# Patient Record
Sex: FEMALE | Race: WHITE
[De-identification: names, ages, dates, MRNs, and addresses within clinical notes are randomized per-mention and may not be internally consistent; named-entity substitution may affect disease eponyms.]

---

## 2019-09-16 ENCOUNTER — HOSPITAL ENCOUNTER (OUTPATIENT)
Dept: HOSPITAL 59 - SUR | Age: 79
Discharge: HOME | End: 2019-09-16
Attending: SURGERY
Payer: MEDICARE

## 2019-09-16 DIAGNOSIS — K80.10: Primary | ICD-10-CM

## 2019-09-16 DIAGNOSIS — F03.90: ICD-10-CM

## 2019-09-16 DIAGNOSIS — F31.9: ICD-10-CM

## 2019-09-18 NOTE — OPERATIVE NOTE
DATE OF SURGERY: 09/16/2019 



SURGEON: Sean Oconnor DO



PREOPERATIVE DIAGNOSIS: Cholelithiasis with chronic cholecystitis. 



POSTOPERATIVE DIAGNOSIS: Cholelithiasis with chronic cholecystitis. 



OPERATION: Laparoscopic cholecystectomy. 



PROCEDURE: The patient is an 79-year-old female who was brought to the operating
room and placed in a supine position. General anesthesia was administered per 
the department of anesthesia. The patient's abdomen was prepped and draped in 
the usual sterile fashion. The infraumbilical region was anesthetized with a 
total of 5 mL of 0.25% Sensorcaine with epinephrine. A 2 cm infraumbilical 
incision was made. This was carried down to the anterior rectus fascia. This was
incised. Kocher clamps were placed on the fascial edges and brought up into the 
wound. Stay sutures of 0 Vicryl were placed. Posterior rectus sheath was 
identified and incised. The peritoneal cavity was entered bluntly. At this time,
a 10 mm blunt Kathy port was placed. Adequate pneumoperitoneum was established.
The patient's entire upper abdomen was really densely adherent. I could not 
really see with the exception of the left upper quadrant. Therefore, additional 
5 mm left upper quadrant port was placed and switched to a 5 mm lens. Through 
the 10 mm port, adhesiolysis was done freeing up the entire upper abdomen. These
were all loose filmy adhesions without evidence of any bowel adhesions. She had 
some dense adhesions in her right lower quadrant consistent with her open 
appendectomy which were left alone. Three additional 5 mm ports were placed in 
the right upper quadrant. The patient was rotated into reverse Trendelenburg 
with rotation to left. The gallbladder retracted in a cephalad and lateral 
direction opening up the angle of Calot. The hepatocystic triangle was 
thoroughly dissected out. There was no aberrant anatomy, no posterior ductal 
structures. The cystic duct and cystic artery were clearly identified. Each one 
was doubly clipped and cut in a standard fashion. We had an excellent critical 
view of safety due to the distal half of the gallbladder released from the 
cystic plate. Once these were doubly clipped and cut in standard fashion, the 
gallbladder was taken off the liver bed with ACE Harmonic. This was placed in an
EndoCatch bag and brought out through the umbilical port. Right upper quadrant 
was rechecked and found to be hemostatic. No bleeding. No bile leaking. No bowel
injury noted. The patient was leveled out. The pneumoperitoneum was released. 
All ports were removed. The fascia was closed with 0 Vicryl in a figure-of-eight
fashion. The skin at all 5 ports was closed with 4-0 Vicryl. The patient was 
taken to the recovery room in stable condition. Final pathology pending. 

SHIELAD

## 2019-09-19 ENCOUNTER — HOSPITAL ENCOUNTER (INPATIENT)
Dept: HOSPITAL 59 - ER | Age: 79
LOS: 3 days | Discharge: HOME HEALTH | DRG: 689 | End: 2019-09-22
Attending: INTERNAL MEDICINE | Admitting: INTERNAL MEDICINE
Payer: MEDICARE

## 2019-09-19 DIAGNOSIS — F20.9: ICD-10-CM

## 2019-09-19 DIAGNOSIS — M19.90: ICD-10-CM

## 2019-09-19 DIAGNOSIS — F31.9: ICD-10-CM

## 2019-09-19 DIAGNOSIS — T17.920A: ICD-10-CM

## 2019-09-19 DIAGNOSIS — R53.1: ICD-10-CM

## 2019-09-19 DIAGNOSIS — R13.10: ICD-10-CM

## 2019-09-19 DIAGNOSIS — J18.9: ICD-10-CM

## 2019-09-19 DIAGNOSIS — Z29.9: ICD-10-CM

## 2019-09-19 DIAGNOSIS — I48.91: ICD-10-CM

## 2019-09-19 DIAGNOSIS — E03.9: ICD-10-CM

## 2019-09-19 DIAGNOSIS — F03.90: ICD-10-CM

## 2019-09-19 DIAGNOSIS — R40.4: ICD-10-CM

## 2019-09-19 DIAGNOSIS — N39.0: Primary | ICD-10-CM

## 2019-09-19 LAB
ABSOLUTE NEUTROPHIL COUNT: 7.76
ALBUMIN SERPL-MCNC: 3.2 G/DL (ref 4–5)
ALP SERPL-CCNC: 66 U/L (ref 35–104)
ALT SERPL-CCNC: 5 U/L (ref ?–33)
ANION GAP SERPL CALC-SCNC: 13 MMOL/L (ref 7–16)
APPEARANCE UR: (no result)
APTT BLD: 27 SECONDS (ref 24.5–39.1)
AST SERPL-CCNC: 19 U/L (ref 10–35)
BACTERIA #/AREA URNS HPF: (no result) /[HPF]
BASOPHILS NFR BLD: 0.8 % (ref 0–6)
BILIRUB DIRECT SERPL-MCNC: < 0.2 MG/DL (ref 0–0.3)
BILIRUB SERPL-MCNC: 0.4 MG/DL (ref 0.2–1)
BILIRUB UR-MCNC: NEGATIVE MG/DL
BUN SERPL-MCNC: 11 MG/DL (ref 8–23)
CO2 SERPL-SCNC: 22 MMOL/L (ref 22–29)
COLOR UR: YELLOW
CREAT SERPL-MCNC: 0.6 MG/DL (ref 0.5–0.9)
EOSINOPHIL NFR BLD: 3.3 % (ref 0–6)
EPI CELLS #/AREA URNS HPF: (no result) /[HPF]
ERYTHROCYTE [DISTWIDTH] IN BLOOD BY AUTOMATED COUNT: 14.6 % (ref 11.5–14.5)
EST GLOMERULAR FILTRATION RATE: > 60 ML/MIN
GLUCOSE SERPL-MCNC: 80 MG/DL (ref 74–109)
GLUCOSE UR STRIP-MCNC: NEGATIVE MG/DL
GRANULOCYTES NFR BLD: 74.2 % (ref 47–80)
HCT VFR BLD CALC: 41.4 % (ref 35–47)
HGB BLD-MCNC: 14 GM/DL (ref 11.6–16)
INR PPP: 1
KETONES UR QL STRIP: (no result)
LIPASE SERPL-CCNC: 9 U/L (ref 13–60)
LYMPHOCYTES NFR BLD AUTO: 14 % (ref 16–45)
MCH RBC QN AUTO: 35 PG (ref 27–33)
MCHC RBC AUTO-ENTMCNC: 33.8 G/DL (ref 32–36)
MCV RBC AUTO: 103.5 FL (ref 81–97)
MONOCYTES NFR BLD: 7.7 % (ref 0–9)
NITRITE UR QL STRIP: POSITIVE
PLATELET # BLD: 162 K/UL (ref 130–400)
PMV BLD AUTO: 11.7 FL (ref 7.4–10.4)
PROT SERPL-MCNC: 6.1 G/DL (ref 6.6–8.7)
PROT UR QL STRIP: NEGATIVE
PROTHROMBIN TIME: 10 SECONDS (ref 9.5–12.1)
RBC # BLD AUTO: 4 M/UL (ref 3.8–5.4)
RBC # UR STRIP: (no result) /UL
RBC #/AREA URNS HPF: (no result) /[HPF]
URINE LEUKOCYTE ESTERASE: NEGATIVE
UROBILINOGEN UR STRIP-ACNC: 0.2 E.U./DL (ref 0.2–1)
WBC # BLD AUTO: 10.5 K/UL (ref 4.2–12.2)

## 2019-09-19 PROCEDURE — 71045 X-RAY EXAM CHEST 1 VIEW: CPT

## 2019-09-19 PROCEDURE — 84484 ASSAY OF TROPONIN QUANT: CPT

## 2019-09-19 PROCEDURE — 99233 SBSQ HOSP IP/OBS HIGH 50: CPT

## 2019-09-19 PROCEDURE — 84145 PROCALCITONIN (PCT): CPT

## 2019-09-19 PROCEDURE — 93005 ELECTROCARDIOGRAM TRACING: CPT

## 2019-09-19 PROCEDURE — 84443 ASSAY THYROID STIM HORMONE: CPT

## 2019-09-19 PROCEDURE — 85610 PROTHROMBIN TIME: CPT

## 2019-09-19 PROCEDURE — 83605 ASSAY OF LACTIC ACID: CPT

## 2019-09-19 PROCEDURE — 81001 URINALYSIS AUTO W/SCOPE: CPT

## 2019-09-19 PROCEDURE — 94760 N-INVAS EAR/PLS OXIMETRY 1: CPT

## 2019-09-19 PROCEDURE — 85730 THROMBOPLASTIN TIME PARTIAL: CPT

## 2019-09-19 PROCEDURE — 85025 COMPLETE CBC W/AUTO DIFF WBC: CPT

## 2019-09-19 PROCEDURE — 99223 1ST HOSP IP/OBS HIGH 75: CPT

## 2019-09-19 PROCEDURE — 99239 HOSP IP/OBS DSCHRG MGMT >30: CPT

## 2019-09-19 PROCEDURE — 83690 ASSAY OF LIPASE: CPT

## 2019-09-19 PROCEDURE — 85027 COMPLETE CBC AUTOMATED: CPT

## 2019-09-19 PROCEDURE — 99285 EMERGENCY DEPT VISIT HI MDM: CPT

## 2019-09-19 PROCEDURE — 80053 COMPREHEN METABOLIC PANEL: CPT

## 2019-09-19 PROCEDURE — 80076 HEPATIC FUNCTION PANEL: CPT

## 2019-09-19 PROCEDURE — 94010 BREATHING CAPACITY TEST: CPT

## 2019-09-19 PROCEDURE — 93010 ELECTROCARDIOGRAM REPORT: CPT

## 2019-09-19 PROCEDURE — 80048 BASIC METABOLIC PNL TOTAL CA: CPT

## 2019-09-19 RX ADMIN — TEMAZEPAM SCH MG: 15 CAPSULE ORAL at 21:12

## 2019-09-19 RX ADMIN — IPRATROPIUM BROMIDE AND ALBUTEROL SULFATE SCH: .5; 2.5 SOLUTION RESPIRATORY (INHALATION) at 17:30

## 2019-09-19 RX ADMIN — MEMANTINE HYDROCHLORIDE SCH MG: 10 TABLET ORAL at 21:10

## 2019-09-19 RX ADMIN — DONEPEZIL HYDROCHLORIDE SCH MG: 5 TABLET, FILM COATED ORAL at 21:10

## 2019-09-19 RX ADMIN — SODIUM CHLORIDE PRN MLS/HR: 0.9 INJECTION, SOLUTION INTRAVENOUS at 15:00

## 2019-09-19 RX ADMIN — CEFTRIAXONE SCH MLS/HR: 1 INJECTION, SOLUTION INTRAVENOUS at 15:06

## 2019-09-19 NOTE — EMERGENCY DEPARTMENT RECORD
History of Present Illness





- General


Chief Complaint: Altered Mental Status


Stated Complaint: UTI,ALTERED LOC


Time Seen by Provider: 19 10:58


Source: Patient, EMS


Mode of Arrival: EMS


Limitations: No limitations





- History of Present Illness


Initial Comments: 


The patient is here by EMS due to weakness for 2 days with some SOB and JENI 

today. She had a Lap Choly surgery here at [facility] 3 days ago and did well. 

Yesterday the patient was having trouble swallowing her medicines so the staff 

at her home did crush up her pills. This AM the patient was very weak and could 

not walk with a walker and had some SOB. EMS was called and found the patient 

with a low biox and also found her to be very weak. Presently the patient is on 

oxygen and feeling better. She denies any AP, fever, chills, dysuria, or cough.





MD Complaint: Weakness


Onset/Timin


-: Days(s)


Consistency: Constant





- Luis Felipe Coma Scale


Eye Response: (4) Open spontaneously


Motor Response: (6) Obeys commands


Verbal Response: (5) Oriented


Luis Felipe Total: 15





- Related Data


                                Home Medications











 Medication  Instructions  Recorded  Confirmed  Last Taken


 


Hydrocodone/Acetaminophen 1 each PO Q6H PRN 19





[Hydrocodone-Acetamin 5-325 mg]    











                                    Allergies











Allergy/AdvReac Type Severity Reaction Status Date / Time


 


No Known Allergies Allergy   Unverified 19 11:18














Travel Screening





- Travel/Exposure Within Last 30 Days


Have you traveled within the last 30 days?: No





- Travel/Exposure Within Last Year


Have you traveled outside the [place] in the last year?: No





- Additonal Travel Details


Have you been exposed to anyone with a communicable illness?: No





Review of Systems


Constitutional: Denies: Chills, Fever


Eyes: Denies: Eye discharge


ENT: Denies: Congestion


Respiratory: Reports: Dyspnea.  Denies: Cough


Cardiovascular: Denies: Arrhythmia


Endocrine: Reports: Fatigue


Gastrointestinal: Denies: Nausea


Genitourinary: Denies: Dysuria


Musculoskeletal: Denies: Arthralgia


Skin: Denies: Bruising





Past Medical History





- SOCIAL HISTORY


Smoking Status: Never smoker


Alcohol Use: None


Drug Use: None





- RESPIRATORY


Hx Respiratory Disorders: Yes


Hx Pneumonia: Yes (HX OF)





- CARDIOVASCULAR


Hx Cardio Disorders: No


Comment:: D/T MENTAL CONDITION STAYS IN BED A LOT





- NEURO


Hx Neuro Disorders: Yes


Hx Dementia: Yes (LIVES IN [facility] HOME)


Hx Weakness: Yes ("FRAIL")


Comment:: LEG TREMORS, TONGUE IS CONSTANTLY MOVING IN HER MOUTH





- GI


Hx GI Disorders: Yes


Hx Abdominal Pain: Yes


Hx Nausea/Vomiting: Yes (NAUSEA)


Hx Wt Loss/Wt Gain: Yes (15 LB WEIGHT LOSS SINCE SPRING)


Comment:: CHOKES ON FOOD AND MEDS WITHOUT THICKET. OCCASS PROB W DIARRHEA WEARS 

A PAD





- 


Hx Genitourinary Disorders: Yes


Hx UTI: Yes (HX OF)





- ENDOCRINE


Hx Endocrine Disorders: Yes


Hx Thyroid Disease: Yes





- MUSCULOSKELETAL


Hx Musculoskeletal Disorders: Yes


Hx Arthritis: Yes ("JOINTS HURT")


Hx Osteoporosis: Yes





- PSYCH


Hx Psych Problems: Yes


Hx Anxiety: Yes


Hx Depression: Yes


Comment:: BIPOLAR SCHIZOPHRENIA DOES WELL ON HER MEDS HAS BEEN HOSPITALIZED IN 

THE [place]





- HEMATOLOGY/ONCOLOGY


Hx Hematology/Oncology Disorders: No





Family Medical History


Any Significant Family History?: No


Family Hx Comment (NOT TO BE USED IN PLACE OF ITEMS BELOW): MOM AND DAD LIVED TO

96 [address] 89.  THYROID





Physical Exam





- General


General Appearance: Alert, Cooperative, No acute distress





- Head


Head exam: Atraumatic, Normocephalic





- Eye


Eye exam: Normal appearance, PERRL





- ENT


Throat exam: Normal inspection.  negative: Tonsillar erythema, Tonsillar exudate





- Neck


Neck exam: Normal inspection, Full ROM.  negative: Tenderness





- Respiratory


Respiratory exam: Normal lung sounds bilaterally.  negative: Respiratory 

distress





- Cardiovascular


Cardiovascular Exam: Regular rate, Normal rhythm, Normal heart sounds





- GI/Abdominal


GI/Abdominal exam: Soft, Normal bowel sounds.  negative: Tenderness





- Extremities


Extremities exam: Normal inspection, Full ROM, Normal capillary refill.  

negative: Tenderness





- Neurological


Neurological exam: Abnormal gait, Alert.  negative: Motor sensory deficit, 

Normal gait





- Psychiatric


Psychiatric exam: Flat affect





- Skin


Skin exam: negative: Rash





Course





                                   Vital Signs











  19





  10:51


 


Temperature 98.0 F


 


Pulse Rate 78


 


Respiratory 18





Rate 


 


Blood Pressure 105/70


 


Pulse Ox 95














- Reevaluation(s)


Reevaluation #1: 


2nd EKG: NSR at 71, neg ST-T changes.





The patient is doing better at this time. She is still weak but is not having 

any CP or SOB. I did discuss the need for admission and the patient does agree. 

I then did discuss the case with Tiffanie (NP) and she does accept the admission 

for Dr. Cool.


19 12:54








Medical Decision Making





- Data Complexity


MDM Data: Labs Ordered and/or Reviewed, X-Ray Ordered and/or Reviewed, EKG 

Ordered and/or Reviewed





- Lab Data


Result diagrams: 


                                 19 11:52





                                 19 11:52





- EKG Data


-: EKG Interpreted by Me


EKG: Abnormal EKG (Afib at 71, neg obvious ischemic changes.)





- Radiology Data


Radiology results: Report reviewed (CXR: Possible atelectasis or early 

infiltrate bilateral lower lobes.)





Disposition


Disposition: Admit


Clinical Impression: 


UTI (urinary tract infection)


Qualifiers:


 Urinary tract infection type: site unspecified Hematuria presence: without 

hematuria Qualified Code(s): N39.0 - Urinary tract infection, site not specified





Disposition: Still a Patient at [facility]


Decision to Admit: Admit from ER


Decision to Admit Date: 19


Decision to Admit Time: 12:56


Accepting Physician: Travon


Time Discussed w/Accepting Physician: 12:56


Condition: (2) Stable


Forms:  Patient Portal Access


Time of Disposition: 12:56





Quality





- Quality Measures


Quality Measures: N/A





- Blood Pressure Screening


View Details: Yes


Does Patient Have Any of the Following: No


Blood Pressure Classification: Normal BP Reading


Systolic Measurement: 105


Diastolic Measurement: 70


Screening for High Blood Pressure: < Normal BP, F/U Not Required > []

## 2019-09-19 NOTE — HISTORY & PHYSICAL
History of Present Illness





- Date of Service


Date of Service for History & Physical: 09/19/19





- History of Present Illness


Admitting Diagnosis: 1. Acute Weakness with UTI and possible Aspiration.  2. New

Onset Afib.


History of Present Illness: 


80 y/o female brought to ED via EMS for hypoxia, JENI and weakness. She had a lap

alex at [facility] 9/16/19, staff at the [facility] in which she lives noticed her having 

difficulty swallowing her usual medications and usual diet. She was very weak, 

not able to ambulate with her walker as usual. Patient denied any fever, chills,

dysuria or cough at time of arrival.  She has a long history of dysphagia with 

unknown etiology. She recently moved to ER from [facility] in Dec. 2018 and

lives at [facility]. She is unmarried with no children. She is cared for by 

her brother , Al, who is her DPOA and sister in law Lizeth.  Per family report 

she has been very inactive since moving to ER, lays in bed often. Past medical 

history includes recurrent aspiration pneumonia (per ARLENE has had 3-4 times in 

the last 9 months), dementia, EPS from antipsychotic use, nausea, dysphagia, OA,

OP, bipolar, schizophrenia.














While in ED U/A positive for nitrites and 2+ bacteria,and ketones, CXR suspected

infiltrate right hemithorax, hyperinflated lungs. Inital EKG showed a-fib with 

controlled rate of 71 with spontaneous conversion to NSR. All other labs 

unremarkable for acute process. She was admitted  for IV antibiotics, SLP eval, 

supplemental oxygen, IV hydration, PT/OT eval

















9/19/19 1600- resting in bed comfortably, in no distress. ARLENE gave majority of 

PMH and HPI. Patient able to engage in history and was able to give an accurate 

account of events














PCP: Dr Garcia








Travel Screening





- Travel/Exposure Within Last 30 Days


Have you traveled within the last 30 days?: No





- Travel/Exposure Within Last Year


Have you traveled outside the [place] in the last year?: No





- Additonal Travel Details


Have you been exposed to anyone with a communicable illness?: No





- Travel Symptoms


Symptom Screening: Weakness





Review of Systems


Constitutional: Denies: Chills, Fever


Eyes: Denies: Eye discharge


ENT: Denies: Congestion


Respiratory: Reports: Dyspnea.  Denies: Cough


Cardiovascular: Denies: Arrhythmia


Endocrine: Reports: Fatigue


Gastrointestinal: Denies: Nausea


Genitourinary: Denies: Dysuria


Musculoskeletal: Denies: Arthralgia


Skin: Denies: Bruising





Past Medical History





- SOCIAL HISTORY


Smoking Status: Never smoker


Alcohol Use: None


Drug Use: None





- RESPIRATORY


Hx Respiratory Disorders: Yes


Hx Pneumonia: Yes (HX OF)





- CARDIOVASCULAR


Hx Cardio Disorders: No


Comment:: D/T MENTAL CONDITION STAYS IN BED A LOT





- NEURO


Hx Neuro Disorders: Yes


Hx Dementia: Yes (LIVES IN [facility] HOME)


Hx Weakness: Yes ("FRAIL")


Comment:: LEG TREMORS, TONGUE IS CONSTANTLY MOVING IN HER MOUTH





- GI


Hx GI Disorders: Yes


Hx Abdominal Pain: Yes


Hx Nausea/Vomiting: Yes (NAUSEA)


Hx Wt Loss/Wt Gain: Yes (15 LB WEIGHT LOSS SINCE SPRING)


Comment:: CHOKES ON FOOD AND MEDS WITHOUT THICKET. OCCASS PROB W DIARRHEA WEARS 

A PAD





- 


Hx Genitourinary Disorders: Yes


Hx UTI: Yes (HX OF)





- ENDOCRINE


Hx Endocrine Disorders: Yes


Hx Thyroid Disease: Yes





- MUSCULOSKELETAL


Hx Musculoskeletal Disorders: Yes


Hx Arthritis: Yes ("JOINTS HURT")


Hx Osteoporosis: Yes





- PSYCH


Hx Psych Problems: Yes


Hx Anxiety: Yes


Hx Depression: Yes


Comment:: BIPOLAR SCHIZOPHRENIA DOES WELL ON HER MEDS HAS BEEN HOSPITALIZED IN 

THE [place]





- HEMATOLOGY/ONCOLOGY


Hx Hematology/Oncology Disorders: No





Family Medical History


Any Significant Family History?: No


Family Hx Comment (NOT TO BE USED IN PLACE OF ITEMS BELOW): MOM AND DAD LIVED TO

[address].  THYROID





H&P Meds/Allergies





- Allergies


Allergies: 


                                    Allergies











Allergy/AdvReac Type Severity Reaction Status Date / Time


 


No Known Allergies Allergy   Unverified 09/03/19 11:18














- Home Medications


                                Home Medications











 Medication  Instructions  Recorded  Confirmed  Last Taken


 


Hydrocodone/Acetaminophen 1 each PO Q6H PRN 09/19/19 09/19/19 09/19/19





[Hydrocodone-Acetamin 5-325 mg]    














- Active Medications


Active Medications: 


                               Current Medications





Acetaminophen (Tylenol 325mg)  650 mg PO Q6H PRN


   PRN Reason: PAIN - MILD(1-4)/FEVER


Hydrocodone Bitart/Acetaminophen (Norco 5mg/325mg)  1 each PO Q6H PRN


   PRN Reason: PAIN - MILD (1-4)


Al Hydroxide/Mg Hydroxide (Maalox)  10 ml PO Q4H PRN


   PRN Reason: GI UPSET


Albuterol Sulfate (Albuterol Sulfate)  2.5 mg INH RESP.Q2H PRN


   PRN Reason: DIFFICULTY IN BREATHING


Albuterol/Ipratropium (Duoneb)  3 ml INH RESP.Q6H.WA JUAN M


Bisacodyl (Dulcolax)  5 mg PO QHS PRN


   PRN Reason: CONSTIPATION


Donepezil HCl (Aricept)  10 mg PO QHS [facility]


CEFTRIAXONE 1GM/50ML BAG (Ceftriaxone 1 Gm-D5w Bag)  1 gm in 50 mls @ 100 mls/hr

IVPB Q12H JUAN M


Sodium Chloride ()  1,000 mls @ 100 mls/hr IV .Q10H PRN


   PRN Reason: LARGE VOLUME IV


Levothyroxine Sodium (Synthroid)  50 mcg PO QD JUAN M


Loratadine (Claritin)  10 mg PO DAILY PRN


   PRN Reason: ALLERGY SYMPTOMS


Memantine (Namenda)  5 mg PO BID [facility]


Ondansetron HCl (Zofran Odt)  4 mg PO Q6H PRN


   PRN Reason: NAUSEA


Potassium Chloride (Klor-Con)  5 meq PO DAILY [facility]


Risperidone (Risperadol)  2 mg PO DAILY JUAN M


Temazepam (Restoril)  30 mg PO QHS [facility]











Physical Exam





- Vital Signs


Vital Signs: 


                            Vital Signs - Last 24 Hrs











  Temp Pulse Resp BP Pulse Ox


 


 09/19/19 14:37   95 H  16  105/78 


 


 09/19/19 14:30   84  24   94 L


 


 09/19/19 10:51  98.0 F  78  18  105/70  95














- General


General Appearance: Alert, Cooperative, No acute distress


Limitations: No limitations





- Head


Head exam: Atraumatic, Normocephalic





- Eye


Eye exam: Normal appearance, PERRL





- ENT


Throat exam: Normal inspection, Other (weak voice and swallow of secretions).  

negative: Tonsillar erythema, Tonsillar exudate





- Neck


Neck exam: Normal inspection, Full ROM.  negative: Tenderness





- Respiratory


Respiratory exam: Normal lung sounds bilaterally.  negative: Respiratory 

distress





- Cardiovascular


Cardiovascular Exam: Regular rate, Normal rhythm, Normal heart sounds





- GI/Abdominal


GI/Abdominal exam: Soft, Normal bowel sounds, Other (steri strips in place to la

paroscopic sites ).  negative: Tenderness





- Extremities


Extremities exam: Normal inspection, Full ROM, Normal capillary refill.  

negative: Tenderness





- Neurological


Neurological exam: Abnormal gait, Alert.  negative: Motor sensory deficit, 

Normal gait





- Psychiatric


Psychiatric exam: Flat affect





- Skin


Skin exam: negative: Rash





Results





- Labs


Result Diagrams: 


                                 09/19/19 11:52





                                 09/19/19 11:52


Labs Last 24 Hours: 


                         Laboratory Results - last 24 hr











  09/19/19 09/19/19 09/19/19





  11:12 11:24 11:52


 


WBC    10.5


 


RBC    4.00


 


Hgb    14.0


 


Hct    41.4


 


MCV    103.5 H


 


MCH    35.0 H


 


MCHC    33.8


 


RDW    14.6 H


 


Plt Count    162


 


MPV    11.7 H


 


Gran %    74.2


 


Lymphocytes %    14.0 L


 


Monocytes %    7.7


 


Eosinophils %    3.3


 


Basophils %    0.8


 


Absolute Neutrophils    7.76


 


PT   Cancelled 


 


INR   Cancelled 


 


APTT   Cancelled 


 


Sodium   


 


Potassium   


 


Chloride   


 


Carbon Dioxide   


 


Anion Gap   


 


BUN   


 


Creatinine   


 


Estimated GFR   


 


Random Glucose   


 


Lactic Acid   


 


Calcium   


 


Total Bilirubin   


 


Direct Bilirubin   


 


AST   


 


ALT   


 


Alkaline Phosphatase   


 


Troponin T   


 


Total Protein   


 


Albumin   


 


Lipase   


 


TSH   


 


Urine Color  Yellow  


 


Urine Appearance  Sl cloudy  


 


Urine pH  5.5  


 


Ur Specific Gravity  >= 1.030  


 


Urine Protein  Negative  


 


Urine Glucose (UA)  Negative  


 


Urine Ketones  15 mg/dl H  


 


Urine Blood  Moderate  


 


Urine Nitrite  Positive H  


 


Urine Bilirubin  Negative  


 


Urine Urobilinogen  0.2  


 


Ur Leukocyte Esterase  Negative  


 


Urine RBC  0 - 2  


 


Urine WBC  3 - 5  


 


Ur Epithelial Cells  0 - 2  


 


Urine Bacteria  2+  














  09/19/19 09/19/19 09/19/19





  11:52 11:52 11:52


 


WBC   


 


RBC   


 


Hgb   


 


Hct   


 


MCV   


 


MCH   


 


MCHC   


 


RDW   


 


Plt Count   


 


MPV   


 


Gran %   


 


Lymphocytes %   


 


Monocytes %   


 


Eosinophils %   


 


Basophils %   


 


Absolute Neutrophils   


 


PT   


 


INR   


 


APTT   


 


Sodium  139  


 


Potassium  4.9 H  


 


Chloride  104  


 


Carbon Dioxide  22.0  


 


Anion Gap  13.0  


 


BUN  11  


 


Creatinine  0.6  


 


Estimated GFR  > 60  


 


Random Glucose  80  


 


Lactic Acid   < 0.2 L 


 


Calcium  8.8  


 


Total Bilirubin  0.40  


 


Direct Bilirubin  < 0.2  


 


AST  19  


 


ALT  5  


 


Alkaline Phosphatase  66  


 


Troponin T    < 0.010


 


Total Protein  6.1 L  


 


Albumin  3.2 L  


 


Lipase  9 L  


 


TSH   


 


Urine Color   


 


Urine Appearance   


 


Urine pH   


 


Ur Specific Gravity   


 


Urine Protein   


 


Urine Glucose (UA)   


 


Urine Ketones   


 


Urine Blood   


 


Urine Nitrite   


 


Urine Bilirubin   


 


Urine Urobilinogen   


 


Ur Leukocyte Esterase   


 


Urine RBC   


 


Urine WBC   


 


Ur Epithelial Cells   


 


Urine Bacteria   














  09/19/19





  11:52


 


WBC 


 


RBC 


 


Hgb 


 


Hct 


 


MCV 


 


MCH 


 


MCHC 


 


RDW 


 


Plt Count 


 


MPV 


 


Gran % 


 


Lymphocytes % 


 


Monocytes % 


 


Eosinophils % 


 


Basophils % 


 


Absolute Neutrophils 


 


PT 


 


INR 


 


APTT 


 


Sodium 


 


Potassium 


 


Chloride 


 


Carbon Dioxide 


 


Anion Gap 


 


BUN 


 


Creatinine 


 


Estimated GFR 


 


Random Glucose 


 


Lactic Acid 


 


Calcium 


 


Total Bilirubin 


 


Direct Bilirubin 


 


AST 


 


ALT 


 


Alkaline Phosphatase 


 


Troponin T 


 


Total Protein 


 


Albumin 


 


Lipase 


 


TSH  1.55


 


Urine Color 


 


Urine Appearance 


 


Urine pH 


 


Ur Specific Gravity 


 


Urine Protein 


 


Urine Glucose (UA) 


 


Urine Ketones 


 


Urine Blood 


 


Urine Nitrite 


 


Urine Bilirubin 


 


Urine Urobilinogen 


 


Ur Leukocyte Esterase 


 


Urine RBC 


 


Urine WBC 


 


Ur Epithelial Cells 


 


Urine Bacteria 














- Imaging and Cardiology


  ** Chest x-ray


Status: Report reviewed (hyperinflated lungs, possible infiltrate right 

hemithorax)





VTE H&P Assessment





- Risk for VTE


Risk for VTE: Yes


Risk Level: Moderate


Risk Assessment Date: 09/19/19


Risk Assessment Time: 16:48


VTE Orders Placed or Will Be Placed: Yes





Plan





- Detailed Diagnosis and Plan


(1) Pneumonia


Current Visit: Yes   Status: Acute   


Qualifiers: 


   Pneumonia type: aspiration pneumonia   Lung location: upper lobe of lung 


Base Code: J18.9 - PNEUMONIA, UNSPECIFIED ORGANISM   Comment: 9/19/19


- CXR shows likely right perihilar infiltrate, given dysphagia most likely 

aspiration, hyperinflated lungs


- Rocephin 1gm 


- Supplemental oxygen


- NS @ 100 ml/hr


- Albuterol Q2hr PRN, Duoneb Q6hr [place]


- IS


- Troponin #1 neg, #2___, #3_____   





(2) UTI (urinary tract infection)


Current Visit: Yes   Status: Acute   


Qualifiers: 


   Urinary tract infection type: site unspecified   Hematuria presence: without 

hematuria   Qualified Code(s): N39.0 - Urinary tract infection, site not 

specified   


Base Code: N39.0 - URINARY TRACT INFECTION, SITE NOT SPECIFIED   Comment: 

9/19/19


- Rocephin 1gm BID


- Urine culture pending


- IVF


   





(3) A-fib


Current Visit: Yes   Status: Acute   Base Code: I48.91 - UNSPECIFIED ATRIAL 

FIBRILLATION   Comment: 9/19/19


- No previous known hx a-fib, no known hx hemorrhagic stroke


- Did spontaneous convert in ED, rate remained controlled


- Is on no chronic anticoagulant therapy


- Telemetry


- Is a high fall risk, however, HAS-BLED score 1. Will prophylax with baby ASA  







(4) Dementia


Current Visit: Yes   Status: Acute   Base Code: F03.90 - UNSPECIFIED DEMENTIA 

WITHOUT BEHAVIORAL DISTURBANCE   Comment: 9/19/19


- Continue home meds   





(5) Bipolar disorder


Current Visit: Yes   Status: Acute   Base Code: F31.9 - BIPOLAR DISORDER, 

UNSPECIFIED   Comment: 9/19/19


- Well controlled


- She does have EPS from antipsychotic with involuntary leg jerking and tongue 

rolling   





(6) Schizophrenia


Current Visit: Yes   Status: Acute   Base Code: F20.9 - SCHIZOPHRENIA, 

UNSPECIFIED   Comment: 9/19/19


- Continue home meds   





(7) Dysphagia


Current Visit: Yes   Status: Acute   Base Code: R13.10 - DYSPHAGIA, UNSPECIFIED 

 Comment: 9/19/19


- Unknown etiology


- SLP eval


- Continue honey thickened liquids as per home   





(8) Hypothyroidism


Current Visit: Yes   Status: Acute   Base Code: E03.9 - HYPOTHYROIDISM, 

UNSPECIFIED   Comment: 9/19/19


- TSH in ED 1.55


- Continue Synthroid 50mcg   





(9) DVT prophylaxis


Current Visit: Yes   Status: Acute   Base Code: Z29.9 - ENCOUNTER FOR 

PROPHYLACTIC MEASURES, UNSPECIFIED   Comment: 9/19/19


- Ambulation with assist


- PT/OT eval


- ASA 81mg


- SCD in bed   





(10) Full code status


Current Visit: Yes   Status: Acute   Base Code: Z78.9 - OTHER SPECIFIED HEALTH 

STATUS

## 2019-09-19 NOTE — SWALLOW EVALUATION
Swallow Evaluation





- General Patient Information


Date of Assessment: 09/19/19


Referral Date: 09/19/19


Date of Onset: 2017


Admitting Diagnosis: 





General weakness, UTI, suspected aspiration


Medical History: 


See H & P for full history. Noted recent lap alex, significant history of 

dysphagia with unknown etiology. Frequent suspected aspiration per DPOA who has 

only been following this patient for the last year so some history is unknown in

relation to dysphagia. Patient has been utilizing thickened liquids (honey 

thick) for approximately two years without known history of speech therapy or 

visualization of the swallow per DPOA and patient. Dementia, Bipolar and 

Schizophrenia.


Current Feeding Status: All oral but limited intake overall which DPOA suspects 

has been life-long.


Cognitive Status: Dementia, alert to self and condition (in hospital). 





Oral Motor Assessment





- Lips


Lips at Rest: Normal Function


Lip Retraction: Abnormal Function


Lip Protrusion: Abnormal Function





- Tongue


Tongue at Rest: Abnormal Function


Tongue Protrusion: Abnormal Function


Tongue Elevation: Abnormal Function


Tongue Lateralization: Abnormal Function (Tongue has tremor more prominent 

during movement than at rest. DPOA reports the intensity varies and can be quite

prominent.)





- Velum


Velum at Rest: Normal Function


Velum Elevation: Normal Function





- Additional Information


Oral Sensitivity: Good


Volitional Cough/Throat Clearing: Weak, unable to elicit true cough of her own 

volition. Reflexive cough weak


Other Oral Motor Comment: Overall delayed general process when completing oral 

motor exam. Patient has time delay for response to commands for oral motor 

movement. ROM is decreased in lingual tasks and overall swallow process is 

visually slow although the reliability for visual identification is not always 

functionally significant.





Swallow Assessment





- Oral Preparatory Phase


Phase - Liquid: Normal Function


Phase - Puree: Normal Function


Phase - Solid: Normal Function





- Oral Phase


Phase - Liquid: Abnormal Function


Phase - Puree: Abnormal Function


Phase - Solid: Abnormal Function (Delay overall.)





- Pharyngeal Phase


Phase - Liquid: Normal Function


Phase - Puree: Normal Function


Phase - Solid: Normal Function (Due to patient history of thickened liquid usage

without access to visualization of swallow this  was unable to trial 

liquids beyond current recommendation which is honey thick liquids due to 

patient suspected aspiraton and difficulty with management of aspiration.)





- Additional Information


Swallow Assessment Comment: At the time of assessment this  is unable to

determine presence/absence of aspiration however her risk factors and sutation 

are highly likely given the following common indicators of aspiration pneumonia 

not limited to: significant weight loss (~15lbs per DPOA within the recent 

past), coughing with liquids, weak cough (non-productive), general weakness, 

frequent hospitalizations for pneumonia, poor diet, history of dehydration which

may be contributed to consistent use of thickened liquids.  Patient is a good 

candidate for visualization of the swallow via Videofoloroscopic Swallow Study 

(VFSS) which is available locally at [facility] in [place] but

cannot be completed at this facility at this time.  A VFSS would allow for a 

through assessment of pharyngeal phase of the swallow which may be able to 

provide additional information toward eitiology and subsequent treatment for dy

sphagia symptoms which cannot be assertained at the bedside at this time.  

Patient completed the bedside assessment Eating Assessment Tool (EAT-10) and 

scored a 22 (anything above a 5 is potential for concern) with the greatest area

of concern being identifeid as swallowing pills as the greatest area of deficit 

per the patient report. Please see below for current diet recommendations.





Plan for Treatment





- Diagnosis/Clinical Impression


Diagnosis: Oropharyngeal dysphagia R 13.12


Clinical Impression: Patient presents with moderate oropharyngeal dysphagia with

suspected aspiration pneumonia with frequent hospitalizations for pneumonia, 

history of weight loss and history of thickened liquid usage. At this time the 

patient is able to follow simple instructions which may assist her in completing

an exercise program in the future as guided by a procedure to visualize the 

swallow to determine if continuation of the thickened liquids and supsected 

dysfunction of the pharyngeal phase of the swallow which cannot be specifically 

identified at the bedside.  Patient is unable to provide specific parts of her 

history which may negatively impact the history as it was gathered at the time 

of this evaluation.





- Consistency Modification


Consistency Modification: Soft


Liquid Modification: Honey


Medication Modification: Crushed in Puree





- Behavior Modification


Behavior Modification: Small Sips, Position upright for all oral intake


Modification Comment: No straws.





- Additional Plan Details


Videofluroscopic Swallow Study Ordered: Yes (Please order when patient is able 

to participate in outpatient procedures.)


Diagnosis/Recommendation Discussed With: Patient, Family, Caregiver





Goals for Treatment





- Additional Goal Detail


Additional Goal Detail: Goals not established due to need for additional 

information required to develop exercise or modification program. Due to the 

additional risks associated with prolonged thickened liquid usage (dehydration, 

increased residue to be aspirated) it is recommended this patient participate in

VFSS as soon as able to safely or as ordered by the physician.

## 2019-09-20 LAB
ABSOLUTE NEUTROPHIL COUNT: 3.53
ALBUMIN SERPL-MCNC: 2.7 G/DL (ref 4–5)
ALBUMIN/GLOB SERPL: 1.4 {RATIO} (ref 1.1–1.8)
ALP SERPL-CCNC: 45 U/L (ref 35–104)
ALT SERPL-CCNC: < 5 U/L (ref ?–33)
ANION GAP SERPL CALC-SCNC: 8 MMOL/L (ref 7–16)
AST SERPL-CCNC: 12 U/L (ref 10–35)
BASOPHILS NFR BLD: 0.8 % (ref 0–6)
BILIRUB SERPL-MCNC: 0.2 MG/DL (ref 0.2–1)
BUN SERPL-MCNC: 9 MG/DL (ref 8–23)
CO2 SERPL-SCNC: 25 MMOL/L (ref 22–29)
CREAT SERPL-MCNC: 0.5 MG/DL (ref 0.5–0.9)
EOSINOPHIL NFR BLD: 2.8 % (ref 0–6)
ERYTHROCYTE [DISTWIDTH] IN BLOOD BY AUTOMATED COUNT: 14.1 % (ref 11.5–14.5)
EST GLOMERULAR FILTRATION RATE: > 60 ML/MIN
GLOBULIN SER-MCNC: 2 GM/DL (ref 1.4–4.8)
GLUCOSE SERPL-MCNC: 91 MG/DL (ref 74–109)
GRANULOCYTES NFR BLD: 49.4 % (ref 47–80)
HCT VFR BLD CALC: 34.4 % (ref 35–47)
HGB BLD-MCNC: 10.3 GM/DL (ref 11.6–16)
LYMPHOCYTES NFR BLD AUTO: 38.7 % (ref 16–45)
MCH RBC QN AUTO: 30.2 PG (ref 27–33)
MCHC RBC AUTO-ENTMCNC: 29.9 G/DL (ref 32–36)
MCV RBC AUTO: 101.2 FL (ref 81–97)
MONOCYTES NFR BLD: 8.3 % (ref 0–9)
PLATELET # BLD: 199 K/UL (ref 130–400)
PMV BLD AUTO: 11.6 FL (ref 7.4–10.4)
PROT SERPL-MCNC: 4.7 G/DL (ref 6.6–8.7)
RBC # BLD AUTO: 3.4 M/UL (ref 3.8–5.4)
WBC # BLD AUTO: 7.2 K/UL (ref 4.2–12.2)

## 2019-09-20 RX ADMIN — RISPERIDONE SCH MG: 1 TABLET, FILM COATED ORAL at 10:37

## 2019-09-20 RX ADMIN — IPRATROPIUM BROMIDE AND ALBUTEROL SULFATE SCH ML: .5; 2.5 SOLUTION RESPIRATORY (INHALATION) at 17:20

## 2019-09-20 RX ADMIN — POTASSIUM CHLORIDE SCH MEQ: 750 TABLET, FILM COATED, EXTENDED RELEASE ORAL at 10:37

## 2019-09-20 RX ADMIN — TEMAZEPAM SCH MG: 15 CAPSULE ORAL at 21:02

## 2019-09-20 RX ADMIN — SODIUM CHLORIDE PRN MLS/HR: 0.9 INJECTION, SOLUTION INTRAVENOUS at 01:40

## 2019-09-20 RX ADMIN — IPRATROPIUM BROMIDE AND ALBUTEROL SULFATE SCH ML: .5; 2.5 SOLUTION RESPIRATORY (INHALATION) at 10:24

## 2019-09-20 RX ADMIN — HYDROCODONE BITARTRATE AND ACETAMINOPHEN PRN EACH: 5; 325 TABLET ORAL at 03:20

## 2019-09-20 RX ADMIN — HYDROCODONE BITARTRATE AND ACETAMINOPHEN PRN EACH: 5; 325 TABLET ORAL at 20:58

## 2019-09-20 RX ADMIN — CEFTRIAXONE SCH: 1 INJECTION, SOLUTION INTRAVENOUS at 03:00

## 2019-09-20 RX ADMIN — IPRATROPIUM BROMIDE AND ALBUTEROL SULFATE SCH: .5; 2.5 SOLUTION RESPIRATORY (INHALATION) at 05:43

## 2019-09-20 RX ADMIN — CEFTRIAXONE SCH MLS/HR: 1 INJECTION, SOLUTION INTRAVENOUS at 19:56

## 2019-09-20 RX ADMIN — MEMANTINE HYDROCHLORIDE SCH MG: 10 TABLET ORAL at 10:39

## 2019-09-20 RX ADMIN — MEMANTINE HYDROCHLORIDE SCH MG: 10 TABLET ORAL at 21:02

## 2019-09-20 RX ADMIN — SODIUM CHLORIDE PRN MLS/HR: 0.9 INJECTION, SOLUTION INTRAVENOUS at 23:25

## 2019-09-20 RX ADMIN — IPRATROPIUM BROMIDE AND ALBUTEROL SULFATE SCH: .5; 2.5 SOLUTION RESPIRATORY (INHALATION) at 22:52

## 2019-09-20 RX ADMIN — DONEPEZIL HYDROCHLORIDE SCH MG: 5 TABLET, FILM COATED ORAL at 21:01

## 2019-09-20 RX ADMIN — ASPIRIN 81 MG CHEWABLE TABLET SCH MG: 81 TABLET CHEWABLE at 10:38

## 2019-09-20 RX ADMIN — HYDROCODONE BITARTRATE AND ACETAMINOPHEN PRN EACH: 5; 325 TABLET ORAL at 10:41

## 2019-09-20 RX ADMIN — SODIUM CHLORIDE PRN MLS/HR: 0.9 INJECTION, SOLUTION INTRAVENOUS at 12:09

## 2019-09-20 NOTE — PHYSICIAN PROGRESS NOTE
Subjective





- Date


Date of Physician Progress Note: 09/20/19





- Subjective


Subjective Comment: 


No acute changes over night. No reports or complaints of chest pain or dyspnea








Objective





- Vital Signs


Vital Signs: 





                            Vital Signs - Last 24 Hrs











  Temp Pulse Pulse Resp BP BP Pulse Ox


 


 09/20/19 10:24   60   18    96


 


 09/20/19 10:20   59 L   18    97


 


 09/20/19 09:30  97.7 F   72  18   109/47  98


 


 09/20/19 05:00  98.1 F   74  20   100/60  95


 


 09/20/19 00:00  98.1 F   70  18   97/60  99


 


 09/19/19 21:00    70  18   


 


 09/19/19 20:01  98.8 F   67  18   91/47  96


 


 09/19/19 14:37   95 H   16  105/78  


 


 09/19/19 14:30   84   24    94 L














- General


General Appearance: Alert, Cooperative, No acute distress


Limitations: No limitations





- Head


Head exam: Atraumatic, Normocephalic





- Eye


Eye exam: Normal appearance, PERRL





- ENT


ENT exam: Mucous membranes moist


Mouth exam: Other (abnormal tongue thrusting)


Throat exam: Normal inspection, Other (weak voice and swallow of secretions).  

negative: Tonsillar erythema, Tonsillar exudate





- Neck


Neck exam: Normal inspection, Full ROM.  negative: Tenderness





- Respiratory


Respiratory exam: Normal lung sounds bilaterally.  negative: Respiratory 

distress





- Cardiovascular


Cardiovascular Exam: Regular rate, Normal rhythm, Normal heart sounds





- GI/Abdominal


GI/Abdominal exam: Soft, Normal bowel sounds, Other (steri strips in place to 

laparoscopic sites ).  negative: Tenderness





- Extremities


Extremities exam: Normal inspection, Full ROM, Normal capillary refill.  

negative: Tenderness





- Neurological


Neurological exam: Abnormal gait, Alert.  negative: Motor sensory deficit, 

Normal gait





- Psychiatric


Psychiatric exam: Flat affect





- Skin


Skin exam: negative: Rash





Assessment and Plan





- Assessment and Plan


(1) Pneumonia


Current Visit: Yes   Status: Acute   


Qualifiers: 


   Pneumonia type: aspiration pneumonia   Lung location: upper lobe of lung 


Base Code: J18.9 - PNEUMONIA, UNSPECIFIED ORGANISM   Comment: 9/20/19


- CXR shows likely right perihilar infiltrate, given dysphagia most likely 

aspiration, hyperinflated lungs


- Rocephin 1gm 


- Supplemental oxygen


- NS @ 100 ml/hr


- Albuterol Q2hr PRN, Duoneb Q6hr WA


- IS


- Troponin #1 neg, #2 neg, #3 neg


- Procalcitonin 0.038, will continue antibiotics as likely aspirating


- Afebrile, clinically stable, most likey DC home tomorrow   





(2) UTI (urinary tract infection)


Current Visit: Yes   Status: Acute   


Qualifiers: 


   Urinary tract infection type: site unspecified   Hematuria presence: without 

hematuria   Qualified Code(s): N39.0 - Urinary tract infection, site not 

specified   


Base Code: N39.0 - URINARY TRACT INFECTION, SITE NOT SPECIFIED   Comment: 

9/20/19


- Rocephin 1gm BID


- Urine culture pending


- IVF


   





(3) A-fib


Current Visit: Yes   Status: Acute   Base Code: I48.91 - UNSPECIFIED ATRIAL 

FIBRILLATION   Comment: 9/20/19


- No previous known hx a-fib, no known hx hemorrhagic stroke


- Did spontaneous convert in ED, rate remained controlled


- Is on no chronic anticoagulant therapy


- Telemetry remains NSR, Repeat EKG NSR


- Is a high fall risk, however, HAS-BLED score 1. Will prophylax with baby ASA  







(4) Dementia


Current Visit: Yes   Status: Acute   Base Code: F03.90 - UNSPECIFIED DEMENTIA 

WITHOUT BEHAVIORAL DISTURBANCE   Comment: 9/20/19


- Continue home meds   





(5) Bipolar disorder


Current Visit: Yes   Status: Acute   Base Code: F31.9 - BIPOLAR DISORDER, 

UNSPECIFIED   Comment: 9/20/19


- Well controlled


- She does have EPS from antipsychotic with involuntary leg jerking and tongue 

rolling   





(6) Schizophrenia


Current Visit: Yes   Status: Acute   Base Code: F20.9 - SCHIZOPHRENIA, UNSPE

CIFIED   Comment: 9/20/19


- Continue home meds   





(7) Dysphagia


Current Visit: Yes   Status: Acute   Base Code: R13.10 - DYSPHAGIA, UNSPECIFIED 

 Comment: 9/20/19


- Unknown etiology


- SLP eval done, will need VFSS at outpaient ( not performed at [facility]), continue 

honey thickened liquids, mech soft diet, no straws, crushed meds


   





(8) Hypothyroidism


Current Visit: Yes   Status: Acute   Base Code: E03.9 - HYPOTHYROIDISM, 

UNSPECIFIED   Comment: 9/20/19


- TSH in ED 1.55


- Continue Synthroid 50mcg   





(9) DVT prophylaxis


Current Visit: Yes   Status: Acute   Base Code: Z29.9 - ENCOUNTER FOR 

PROPHYLACTIC MEASURES, UNSPECIFIED   Comment: 9/20/19


- Ambulation with assist


- PT/OT eval


- ASA 81mg


- SCD in bed   





(10) Full code status


Current Visit: Yes   Status: Acute   Base Code: Z78.9 - OTHER SPECIFIED HEALTH 

STATUS   





Results





- Labs


Result Diagrams: 


                                 09/20/19 06:25





                                 09/20/19 06:25


Labs Last 24 Hours: 





                         Laboratory Results - last 24 hr











  09/19/19 09/19/19 09/19/19





  11:52 16:50 16:50


 


WBC   


 


RBC   


 


Hgb   


 


Hct   


 


MCV   


 


MCH   


 


MCHC   


 


RDW   


 


Plt Count   


 


MPV   


 


Gran %   


 


Lymphocytes %   


 


Monocytes %   


 


Eosinophils %   


 


Basophils %   


 


Absolute Neutrophils   


 


PT   


 


INR   


 


APTT   


 


Sodium   


 


Potassium   


 


Chloride   


 


Carbon Dioxide   


 


Anion Gap   


 


BUN   


 


Creatinine   


 


Estimated GFR   


 


Random Glucose   


 


Calcium   


 


Total Bilirubin   


 


AST   


 


ALT   


 


Alkaline Phosphatase   


 


Troponin T   < 0.010 


 


Total Protein   


 


Albumin   


 


Globulin   


 


Albumin/Globulin Ratio   


 


Lipase  9 L  


 


Procalcitonin    0.038














  09/19/19 09/20/19 09/20/19





  17:01 00:20 06:25


 


WBC    7.2


 


RBC    3.40 L


 


Hgb    10.3 L


 


Hct    34.4 L


 


MCV    101.2 H


 


MCH    30.2


 


MCHC    29.9 L


 


RDW    14.1


 


Plt Count    199


 


MPV    11.6 H


 


Gran %    49.4


 


Lymphocytes %    38.7


 


Monocytes %    8.3


 


Eosinophils %    2.8


 


Basophils %    0.8


 


Absolute Neutrophils    3.53


 


PT  10.0  


 


INR  1.0  


 


APTT  27.0  


 


Sodium   


 


Potassium   


 


Chloride   


 


Carbon Dioxide   


 


Anion Gap   


 


BUN   


 


Creatinine   


 


Estimated GFR   


 


Random Glucose   


 


Calcium   


 


Total Bilirubin   


 


AST   


 


ALT   


 


Alkaline Phosphatase   


 


Troponin T   < 0.010 


 


Total Protein   


 


Albumin   


 


Globulin   


 


Albumin/Globulin Ratio   


 


Lipase   


 


Procalcitonin   














  09/20/19





  06:25


 


WBC 


 


RBC 


 


Hgb 


 


Hct 


 


MCV 


 


MCH 


 


MCHC 


 


RDW 


 


Plt Count 


 


MPV 


 


Gran % 


 


Lymphocytes % 


 


Monocytes % 


 


Eosinophils % 


 


Basophils % 


 


Absolute Neutrophils 


 


PT 


 


INR 


 


APTT 


 


Sodium  143


 


Potassium  3.9


 


Chloride  110 H


 


Carbon Dioxide  25.0


 


Anion Gap  8.0


 


BUN  9


 


Creatinine  0.5


 


Estimated GFR  > 60


 


Random Glucose  91


 


Calcium  7.8 L


 


Total Bilirubin  0.20


 


AST  12


 


ALT  < 5


 


Alkaline Phosphatase  45


 


Troponin T 


 


Total Protein  4.7 L


 


Albumin  2.7 L


 


Globulin  2.0


 


Albumin/Globulin Ratio  1.4


 


Lipase 


 


Procalcitonin 














DVT/PE Assessment





- Risk for VTE


Risk for VTE: No


Risk Level: Moderate


Risk Assessment Date: 09/19/19


Risk Assessment Time: 16:48


VTE Orders Placed or Will Be Placed: Yes





- Active Medicaitons


Current Medications: 





                               Current Medications





Acetaminophen (Tylenol 325mg)  650 mg PO Q6H PRN


   PRN Reason: PAIN - MILD(1-4)/FEVER


Hydrocodone Bitart/Acetaminophen (Norco 5mg/325mg)  1 each PO Q6H PRN


   PRN Reason: PAIN - MILD (1-4)


   Last Admin: 09/20/19 10:41 Dose:  1 each


   Documented by: 


Al Hydroxide/Mg Hydroxide (Maalox)  10 ml PO Q4H PRN


   PRN Reason: GI UPSET


Albuterol Sulfate (Albuterol Sulfate)  2.5 mg INH RESP.Q2H PRN


   PRN Reason: DIFFICULTY IN BREATHING


Albuterol/Ipratropium (Duoneb)  3 ml INH RESP.Q6H.[facility]


   Last Admin: 09/20/19 10:24 Dose:  3 ml


   Documented by: 


Aspirin (Aspirin Chewable)  81 mg PO DAILY [facility]


   Last Admin: 09/20/19 10:38 Dose:  81 mg


   Documented by: 


Bisacodyl (Dulcolax)  5 mg PO QHS PRN


   PRN Reason: CONSTIPATION


Donepezil HCl (Aricept)  10 mg PO QHS [facility]


   Last Admin: 09/19/19 21:10 Dose:  10 mg


   Documented by: 


Sodium Chloride ()  1,000 mls @ 100 mls/hr IV .Q10H PRN


   PRN Reason: LARGE VOLUME IV


   Last Admin: 09/20/19 12:09 Dose:  100 mls/hr


   Documented by: 


CEFTRIAXONE 1GM/50ML BAG (Ceftriaxone 1 Gm-D5w Bag)  1 gm in 50 mls @ 100 mls/hr

IVPB Q12HR [facility]


   Last Infusion: 09/20/19 12:25 Dose:  Infused


   Documented by: 


Levothyroxine Sodium (Synthroid)  50 mcg PO DAILYTHY [facility]


Loratadine (Claritin)  10 mg PO DAILY PRN


   PRN Reason: ALLERGY SYMPTOMS


Memantine (Namenda)  5 mg PO BID [facility]


   Last Admin: 09/20/19 10:39 Dose:  5 mg


   Documented by: 


Ondansetron HCl (Zofran Odt)  4 mg PO Q6H PRN


   PRN Reason: NAUSEA


Potassium Chloride (Klor-Con)  5 meq PO DAILY [facility]


   Last Admin: 09/20/19 10:37 Dose:  5 meq


   Documented by: 


Risperidone (Risperadol)  2 mg PO DAILY [facility]


   Last Admin: 09/20/19 10:37 Dose:  2 mg


   Documented by: 


Temazepam (Restoril)  30 mg PO QHS [facility]


   Last Admin: 09/19/19 21:12 Dose:  30 mg


   Documented by: 











AMI Plan





- Labs


Result Diagrams: 


                                 09/20/19 06:25





                                 09/20/19 06:25

## 2019-09-20 NOTE — REHAB EVALUATION
Patient Information





- Patient Information


Diagnosis: Acute weakness with UTI and possible aspiration. New onset Afib


Ordered Treatment: PT Evaluate and Treat


Status: Initial Evaluation


Past Medical/Surgical Hx: 


                          PAST MEDICAL/SURGICAL HISTORY





Past Surgical History            Lap Esme 3 days PTA (9/16/19)


                                 APPY


                                 EGD





PMH - Respiratory





Hx Respiratory Disorders         Yes


Hx Pneumonia                     Yes: HX OF





PMH - Cardiovascular





Hx Cardiovascular Disorders      No


Comment:                         D/T MENTAL CONDITION STAYS IN BED A LOT





PMH - Neuro





Hx Neurological Disorders        Yes


Hx Dementia                      Yes: LIVES IN [facility] HOME


Hx Weakness                      Yes: "FRAIL"


Comment:                         LEG TREMORS, TONGUE IS CONSTANTLY MOVING IN HER


                                 MOUTH





PMH - GI





Hx Gastrointestinal Disorders    Yes


Hx Abdominal Pain                Yes


Hx Nausea/Vomiting               Yes: NAUSEA


Hx Weight Loss/Weight Gain       Yes: 15 LB WEIGHT LOSS SINCE SPRING


Comment:                         CHOKES ON FOOD AND MEDS WITHOUT THICKET. OCCASS


                                 PROB W DIARRHEA WEARS A PAD





PMH - 





Hx Genitourinary Disorders       Yes


Patient Pregnant                 No


Hx Urinary Tract Infection       Yes: HX OF





PMH - Endocrine





Hx Endocrine Disorders           Yes


Hx Thyroid Disease               Yes





PMH - Musculoskeletal





Hx Musculoskeletal Disorders     Yes


Hx Arthritis                     Yes: "JOINTS HURT"


Hx Osteoporosis                  Yes





PMH - Psych





Hx Psychiatric Problems          Yes


Hx Anxiety                       Yes


Hx Depression                    Yes


Comment:                         BIPOLAR SCHIZOPHRENIA DOES WELL ON HER MEDS HAS


                                 BEEN HOSPITALIZED IN THE [facility]





PMH - Hematology/Oncology





Hx Hematology/Oncology           No


Disorders                        








Premorbid Status: Detail (Patient was getting assistance for bathing, drsg, and 

toileting at [facility] prior to admission. She states she was able to complete upper 

body drsg by herself but had assistance for underwear, pants, socks, and shoes. 

The patient was ambulatory with 4 wheeled walker household distances prior to 

admission.)


Social History: Detail (Patient is a resident at [facility] having just moved

to [place] from [facility] in 2018.The [facility] has no stairs at the 

enterance. The patient has a 4 wheeled walker. She has no spouse or children and

her brother is the one who takes care of her.)


Precautions: Universal, Fall





- Time With Patient


Total Time Spent With Patient (Min): 30


Treatment Procedures: Detail (PT Evaluation, low complexity.)





Subjective Information





- Subjective Information


Per Patient (The patient had no complaints of pain. The patient denies feeling 

weak.)





Objective Data





- Mental Status


Patient Orientation: Oriented x3





- Visual Perception


Appears within normal limits for therapeutic activities





- ROM


Not within normal limits (The patient's LE AROM is WFL. Refer to OT note for UE 

AROM.)





- Strength/Tone


Not within normal limits (The patient's LE strength is as follows: Hip flexors R

3+/5, L 4-/5, bilateral hip abductors and adductors 4-/5,  bilateral knee 

extensors 4/5, bilateral knee flexors 4-/5, ankle musculature 4/5. Refer to OT 

note for UE strength.)





- Coordination


Appears within normal limits for therapeutic activities (The patient's LE 

coordination with JONATAN's for alternating marching and foot tapping was normal.)





- Bed Mobility


Independent (The patient was independent with supine to sit transfer.)





- Transfers


Independent (The patient was independent/supervision with sit to and from stand 

transfer with occasional verbal cues to push up from the surface.)





- Balance


Balance Sitting: Good


Balance Standing: Fair (The patient was able to stand without support of walker 

however increased postural sway was noted. With perturbations the patient 

exhibited decreased balance reactions posteriorly and anteriorly. Lateral 

balance reactions were not tested.)





- Gait


Detail (The patient ambulated with front wheeled walker a distance of 5 feet x 1

with CG supervision for safety without use of O2. The patient's gait pattern was

charecterized by decreased stride length and decreased heel to toe weight 

transfer.)





Therapy Assessment





- Therapy Assessment


Detail (The patient exhibits decreased LE strength, decreased balance and 

decreased ability to complete prolonged physical activity. Due to physical 

deficits outlined above the patient would benefit from ongoing physical therapy 

to improve balance and LE strength and assess safety of mobility in home 

environment. The patient was left in chair with call light within reach and 

bedside table in front of her. Nursing staff was notified of patient's 

position.)





Problem List





- Problem List


Physical Therapy Problem List: Detail (1) Decreased LE strength 2) Decreased 

standing balance 3) Decreased ambulation distance and decreased ability to 

complete prolonged physical activity.)


Occupational Therapy Problem List: Detail (1. Pain affecting ROM to reach to 

lower LE's to assist with ADLs)





Goals





- Goals


Physical Therapy Goals: 1) Assess the patient's balance using an objective 

balance test.  2) Increased LE strength 1/3 muscle grade to improve stability of

gait.  3) The patient will ambulate with appropriate assistive device household 

distances as required at [facility] with supervision for safety.


Occupational Therapy Goals: 1. Increase ability to assist with LB ADLs once pain

resolved





Prognosis





- Prognosis


Good





Plan





- Plan


Physical Therapy Plan: PT daily M-F for gait training, LE strengthening and 

balance exercises. Recommend ongoing Home PT after discharge from [facility].


Occupational Therapy Plan: Patient appears to be at baseline for assistance 

needed for ADLs. She shows some mild UE weakness but feel this will improve once

pain is resolved from UTI. Do not anticipate patient needing further OT at this 

time but will continue to monitor patient for any functional changes throughout 

her stay at [facility].

## 2019-09-20 NOTE — RADIOLOGY REPORT
EXAM:  PORTABLE CHEST



HISTORY:  DIFFICULTY IN BREATHING.   



TECHNIQUE:  A single mobile upright view of the chest was obtained.  



Comparison:  None.  



FINDINGS:  The heart is not enlarged.  No pulmonary venous hypertension is seen.
 The thoracic aorta is mildly tortuous and atherosclerotic.  Minor linear 
atelectasis or scarring is questioned in the left lung base.  



Minor increased opacity in the right lung base may just relate to 
superimposition of normal shadows though minor atelectasis or infiltrate cannot 
be excluded.  The lungs and pleural spaces are otherwise clear.  The lungs are 
hyperinflated consistent with COPD.  



Old healed fracture deformity of the proximal left humerus.  



IMPRESSION:  

1.  HYPERINFLATION OF THE LUNGS CONSISTENT WITH COPD.  



2.  MINOR LINEAR ATELECTASIS VERSUS SCARRING IN THE LEFT LUNG BASE.  



3.  RELATIVE MILD INCREASED OPACITY IN THE RIGHT HEMITHORAX SPACE MAY JUST 
RELATE TO SUPERIMPOSITION OF NORMAL SHADOWS THOUGH MINOR ATELECTASIS OR 
INFILTRATE CANNOT BE EXCLUDED.  



JOB NUMBER:  [id number]

MTDD

## 2019-09-20 NOTE — REHAB EVALUATION
Patient Information





- Patient Information


Diagnosis: Acute weakness with UTI and possible aspiration. New onset Afib


Ordered Treatment: OT Evaluate and Treat


Status: Initial Evaluation


Past Medical/Surgical Hx: 


                          PAST MEDICAL/SURGICAL HISTORY





Past Surgical History            Lap Esme 3 days PTA (9/16/19)


                                 APPY


                                 EGD





PMH - Respiratory





Hx Respiratory Disorders         Yes


Hx Pneumonia                     Yes: HX OF





PMH - Cardiovascular





Hx Cardiovascular Disorders      No


Comment:                         D/T MENTAL CONDITION STAYS IN BED A LOT





PMH - Neuro





Hx Neurological Disorders        Yes


Hx Dementia                      Yes: LIVES IN [facility] HOME


Hx Weakness                      Yes: "FRAIL"


Comment:                         LEG TREMORS, TONGUE IS CONSTANTLY MOVING IN HER


                                 MOUTH





PMH - GI





Hx Gastrointestinal Disorders    Yes


Hx Abdominal Pain                Yes


Hx Nausea/Vomiting               Yes: NAUSEA


Hx Weight Loss/Weight Gain       Yes: 15 LB WEIGHT LOSS SINCE SPRING


Comment:                         CHOKES ON FOOD AND MEDS WITHOUT THICKET. OCCASS


                                 PROB W DIARRHEA WEARS A PAD





PMH - 





Hx Genitourinary Disorders       Yes


Patient Pregnant                 No


Hx Urinary Tract Infection       Yes: HX OF





PMH - Endocrine





Hx Endocrine Disorders           Yes


Hx Thyroid Disease               Yes





PMH - Musculoskeletal





Hx Musculoskeletal Disorders     Yes


Hx Arthritis                     Yes: "JOINTS HURT"


Hx Osteoporosis                  Yes





PMH - Psych





Hx Psychiatric Problems          Yes


Hx Anxiety                       Yes


Hx Depression                    Yes


Comment:                         BIPOLAR SCHIZOPHRENIA DOES WELL ON HER MEDS HAS


                                 BEEN HOSPITALIZED IN THE [facility]





PMH - Hematology/Oncology





Hx Hematology/Oncology           No


Disorders                        








Premorbid Status: Detail (Patient was getting assistance for bathing, drsg, and 

toileting at [facility] prior to admission. She states she was able to complete upper 

body drsg by herself but had assistance for underwear, pants, socks, and shoes.)


Social History: Detail (Patient is a resident at [facility] having just moved

to [place] from [facility] in 2018. She has no spouse or children and 

her brother is the one who takes care of her.)


Precautions: Universal, Fall





- Time With Patient


Total Time Spent With Patient (Min): 25


Treatment Procedures: Detail (Eval OT Low)





Subjective Information





- Subjective Information


Per Patient





Objective Data





- Pain


Pain Present: Yes


Pain Intensity: 8 (lower abdomin. Nsg aware)


Pain Scale Used: Numeric (1 - 10)





- Mental Status


Patient Orientation: Oriented x3 (Patient able to state full name, birthdate, 

age, current year & month, current location. Unable to give exact day of the 

month.)





- Visual Perception


Appears within normal limits for therapeutic activities (Patient wears glasses)





- ROM


Not within normal limits (Patient presents with AROM of R shld flex to ~135 deg 

and L shld flex to ~140 deg. Bilateral shld internal rotation mildly impaired. 

She is WNL for bilateral elbow, forearm and digit ROM.)





- Strength/Tone


Not within normal limits (Patient grossly 4/5 for all shld, forearm, and wrist 

MMT on the right side. C/o some pain in R shld with MMT testing. Patient shows 

increased resistance to break test with verbal cues. Patient grossly 3+ to 4/5 

for all LUE MMT. Bilateral weakened gross grasp.)





- Coordination


Appears within normal limits for therapeutic activities





- Bed Mobility


Independent (but with verbal cues. Patient able to scoot back into bed 

independently with only min verbal cues to use feet to push hips up. Patient 

able to roll from supine to side-lyine (at patient request due to pain in 

abdomin) independently but with verbal cues for hand placement and rotating 

knees.)





- Transfers


Independent (sit<>stand t/f from bedside commode using 2WW for support in 

standing. Patient able to independently ambulate 3-4 steps from commode to edge 

of bed using 2WW.)





- Balance


Balance Sitting: Good





- Sensation


Intact





- ADL's/IADL's


Detail (Patient was on commode upon OT arrival. Nsg stated they had been just 

wiping pt's bottom while seated on commode but when OT placed 2WW in front of 

patient and patient asked to try and stand she immediately stood up and 

maintained steady supported standing position while nsg completed dependent toil

et H. Patient had no clothing available for therapist to attempt ADL eval with, 

however, with prompting, patient attempted to doff socks. After initial trial 

patient stated that she doesn't put her own socks or pants on at [facility] and 

that staff assists her with this. She does complete UB drsg independently per 

subjective report. Patient refused any other drsg evaluation. She also stated 

that she gets assistance for bathing also. Pt states she doesn't feel "weaker" 

in her arms but is just painful.)





Therapy Assessment





- Therapy Assessment


Detail (Patient presents with weakness, decreased independence with ADLs 

however, both do not appear far from patients initial baseline prior to 

admission. She c/o pain from UTI that is affecting some aspects of bed mobility 

and trunk flexion to reach LE's. At this time, will continue to monitor 

patient's status and provide therapy if needed but do not anticipate patient 

needing further OT.)





Problem List





- Problem List


Occupational Therapy Problem List: Detail (1. Pain affecting ROM to reach to 

lower LE's to assist with ADLs)





Goals





- Goals


Occupational Therapy Goals: 1. Increase ability to assist with LB ADLs once pain

resolved





Prognosis





- Prognosis


Good





Plan





- Plan


Occupational Therapy Plan: Patient appears to be at baseline for assistance 

needed for ADLs. She shows some mild UE weakness but feel this will improve once

pain is resolved from UTI. Do not anticipate patient needing further OT at this 

time but will continue to monitor patient for any functional changes throughout 

her stay at [facility].

## 2019-09-21 RX ADMIN — HYDROCODONE BITARTRATE AND ACETAMINOPHEN PRN EACH: 5; 325 TABLET ORAL at 02:51

## 2019-09-21 RX ADMIN — DONEPEZIL HYDROCHLORIDE SCH MG: 5 TABLET, FILM COATED ORAL at 21:02

## 2019-09-21 RX ADMIN — IPRATROPIUM BROMIDE AND ALBUTEROL SULFATE SCH: .5; 2.5 SOLUTION RESPIRATORY (INHALATION) at 06:23

## 2019-09-21 RX ADMIN — IPRATROPIUM BROMIDE AND ALBUTEROL SULFATE SCH ML: .5; 2.5 SOLUTION RESPIRATORY (INHALATION) at 10:56

## 2019-09-21 RX ADMIN — POTASSIUM CHLORIDE SCH MEQ: 750 TABLET, FILM COATED, EXTENDED RELEASE ORAL at 09:04

## 2019-09-21 RX ADMIN — MEMANTINE HYDROCHLORIDE SCH MG: 10 TABLET ORAL at 21:00

## 2019-09-21 RX ADMIN — CEFDINIR SCH MG: 300 CAPSULE ORAL at 21:01

## 2019-09-21 RX ADMIN — MEMANTINE HYDROCHLORIDE SCH MG: 10 TABLET ORAL at 09:04

## 2019-09-21 RX ADMIN — HYDROCODONE BITARTRATE AND ACETAMINOPHEN PRN EACH: 5; 325 TABLET ORAL at 15:25

## 2019-09-21 RX ADMIN — LEVOTHYROXINE SODIUM SCH MCG: 50 TABLET ORAL at 06:58

## 2019-09-21 RX ADMIN — IPRATROPIUM BROMIDE AND ALBUTEROL SULFATE SCH ML: .5; 2.5 SOLUTION RESPIRATORY (INHALATION) at 17:19

## 2019-09-21 RX ADMIN — RISPERIDONE SCH MG: 1 TABLET, FILM COATED ORAL at 09:04

## 2019-09-21 RX ADMIN — TEMAZEPAM SCH MG: 15 CAPSULE ORAL at 21:01

## 2019-09-21 RX ADMIN — CEFTRIAXONE SCH MLS/HR: 1 INJECTION, SOLUTION INTRAVENOUS at 06:58

## 2019-09-21 RX ADMIN — ASPIRIN 81 MG CHEWABLE TABLET SCH MG: 81 TABLET CHEWABLE at 09:03

## 2019-09-21 NOTE — DISCHARGE SUMMARY
Providers


Discharge Summary Date: 09/21/19


Date of admission: 


09/20/19 04:44





Attending physician: 


ZOE FLYNN








Physical Exam





- Vital Signs


Vital Signs: 


                            Vital Signs - Last 24 Hrs











  Temp Pulse Pulse Pulse Resp BP BP


 


 09/21/19 06:00  98.4 F    55 L  14   109/55


 


 09/21/19 02:57  97.8 F    61  14   119/60


 


 09/20/19 21:00    60   16  


 


 09/20/19 20:49  98.5 F    60  16   108/64


 


 09/20/19 18:00  98.9 F    81  16   110/71


 


 09/20/19 17:20   66    16  


 


 09/20/19 13:14  98.4 F      101/48 


 


 09/20/19 13:00  98.4 F    78  16   101/48


 


 09/20/19 10:24   60    18  


 


 09/20/19 10:20   59 L    18  


 


 09/20/19 09:30  97.7 F    72  18   109/47














  Pulse Ox


 


 09/21/19 06:00  92 L


 


 09/21/19 02:57  93 L


 


 09/20/19 21:00 


 


 09/20/19 20:49  93 L


 


 09/20/19 18:00  91 L


 


 09/20/19 17:20  99


 


 09/20/19 13:14 


 


 09/20/19 13:00  97


 


 09/20/19 10:24  96


 


 09/20/19 10:20  97


 


 09/20/19 09:30  98














- General


General Appearance: Alert, Cooperative, No acute distress


Limitations: No limitations





- Head


Head exam: Atraumatic, Normocephalic





- Eye


Eye exam: Normal appearance, PERRL





- ENT


ENT exam: Mucous membranes moist


Mouth exam: Other (abnormal tongue thrusting)


Throat exam: Normal inspection, Other (weak voice and swallow of secretions).  

negative: Tonsillar erythema, Tonsillar exudate





- Neck


Neck exam: Normal inspection, Full ROM.  negative: Tenderness





- Respiratory


Respiratory exam: Normal lung sounds bilaterally.  negative: Respiratory 

distress





- Cardiovascular


Cardiovascular Exam: Regular rate, Normal rhythm, Normal heart sounds





- GI/Abdominal


GI/Abdominal exam: Soft, Normal bowel sounds, Other (steri strips in place to 

laparoscopic sites ).  negative: Tenderness





- Extremities


Extremities exam: Normal inspection, Full ROM, Normal capillary refill.  

negative: Tenderness





- Neurological


Neurological exam: Abnormal gait, Alert.  negative: Motor sensory deficit, 

Normal gait





- Psychiatric


Psychiatric exam: Flat affect





- Skin


Skin exam: negative: Rash





Hospitalization





- Hospitalization


Admission Diagnosis: 1. Acute Weakness with UTI and possible Aspiration.  2. New

Onset Afib.





- Problem List/Discharge Diagnosis


(1) Pneumonia


Current Visit: Yes   Status: Acute   


Discharge Diagnosis: 


   Pneumonia type: aspiration pneumonia   Lung location: upper lobe of lung 


Base Code: J18.9 - PNEUMONIA, UNSPECIFIED ORGANISM   Comment: 9/21/19


- CXR shows likely right perihilar infiltrate, given dysphagia most likely 

aspiration, hyperinflated lungs


- Rocephin 1gm 


- Supplemental oxygen


- NS @ 100 ml/hr


- Albuterol Q2hr PRN, Duoneb Q6hr WA


- IS


- Troponin #1 neg, #2 neg, #3 neg


- Procalcitonin 0.038, will continue antibiotics as likely aspirating


- Afebrile, clinically stable, most likey DC home tomorrow   





(2) UTI (urinary tract infection)


Current Visit: Yes   Status: Acute   


Discharge Diagnosis: 


   Urinary tract infection type: site unspecified   Hematuria presence: without 

hematuria   Qualified Code(s): N39.0 - Urinary tract infection, site not 

specified   


Base Code: N39.0 - URINARY TRACT INFECTION, SITE NOT SPECIFIED   Comment: 

9/21/19


- Rocephin 1gm BID


- Urine culture pending


- IVF


   





(3) A-fib


Current Visit: Yes   Status: Acute   Base Code: I48.91 - UNSPECIFIED ATRIAL 

FIBRILLATION   Comment: 9/21/19


- No previous known hx a-fib, no known hx hemorrhagic stroke


- Did spontaneous convert in ED, rate remained controlled


- Is on no chronic anticoagulant therapy


- Telemetry remains NSR, Repeat EKG NSR


- Is a high fall risk, however, HAS-BLED score 1. Will prophylax with baby ASA  







(4) Dementia


Current Visit: Yes   Status: Acute   Base Code: F03.90 - UNSPECIFIED DEMENTIA 

WITHOUT BEHAVIORAL DISTURBANCE   Comment: 9/21/19


- Continue home meds   





(5) Bipolar disorder


Current Visit: Yes   Status: Acute   Base Code: F31.9 - BIPOLAR DISORDER, 

UNSPECIFIED   Comment: 9/21/19


- Well controlled


- She does have EPS from antipsychotic with involuntary leg jerking and tongue 

rolling   





(6) Schizophrenia


Current Visit: Yes   Status: Acute   Base Code: F20.9 - SCHIZOPHRENIA, 

UNSPECIFIED   Comment: 9/21/19


- Continue home meds   





(7) Dysphagia


Current Visit: Yes   Status: Acute   Base Code: R13.10 - DYSPHAGIA, UNSPECIFIED 

 Comment: 9/20/19


- Unknown etiology


- SLP eval done, will need VFSS at outpaient ( not performed at [facility]), continue 

honey thickened liquids, mech soft diet, no straws, crushed meds


   





(8) Hypothyroidism


Current Visit: Yes   Status: Acute   Base Code: E03.9 - HYPOTHYROIDISM, 

UNSPECIFIED   Comment: 9/21/19


- TSH in ED 1.55


- Continue Synthroid 50mcg   





(9) DVT prophylaxis


Current Visit: Yes   Status: Acute   Base Code: Z29.9 - ENCOUNTER FOR 

PROPHYLACTIC MEASURES, UNSPECIFIED   Comment: 9/21/19


- Ambulation with assist


- PT/OT eval


- ASA 81mg


- SCD in bed   





(10) Full code status


Current Visit: Yes   Status: Acute   Base Code: Z78.9 - OTHER SPECIFIED HEALTH 

STATUS   





- Hospitalization Course


Disposition: Home Health Service


Hospital Course: 


80 y/o female brought to ED via EMS for hypoxia, JENI and weakness. She had a lap

alex at [facility] 9/16/19, staff at the [facility] in which she lives noticed her having 

difficulty swallowing her usual medications and usual diet. She was very weak, 

not able to ambulate with her walker as usual. Patient denied any fever, chills,

dysuria or cough at time of arrival.  She has a long history of dysphagia with 

unknown etiology. She recently moved to [place] from [facility] in Dec. 2018 and

lives at [facility]. She is unmarried with no children. She is cared for by 

her brother , Al, who is her DPOA and sister in law Lizeth.  Per family report 

she has been very inactive since moving to ER, lays in bed often. Past medical 

history includes recurrent aspiration pneumonia (per ARLENE has had 3-4 times in 

the last 9 months), dementia, EPS from antipsychotic use, nausea, dysphagia, OA,

OP, bipolar, schizophrenia.














While in ED U/A positive for nitrites and 2+ bacteria,and ketones, CXR suspected

infiltrate right hemithorax, hyperinflated lungs. Inital EKG showed a-fib with 

controlled rate of 71 with spontaneous conversion to NSR. All other labs 

unremarkable for acute process. She was admitted  for IV antibiotics, SLP eval, 

supplemental oxygen, IV hydration, PT/OT eval

















9/19/19 1600- resting in bed comfortably, in no distress. ARLENE gave majority of 

PMH and HPI. Patient able to engage in history and was able to give an accurate 

account of events








9/20/19 0840- Hospital course has been unremarkable. Patient has tolerated 

Rocephin for both UTI and pneumonia- suspected aspiration given hx of dysphagia 

and recurrent pneumonia. She did have SLP eval, continue with honey thick 

liquids, crushed meds, no straws, and mechanical soft foods. VFSS recommended 

and scheduled as outpatient as not performed at this hospital. PT/OT eval 

completed and recommended continued outpatient therapy services which were 

arranged in addition to home nursing due to dysphagia and recurrent pneumonia. 

She was found to be in a-fib in the ED upon arrival with controlled rate, she 

spontaneously converted prior to admission. No previous history of a-fib. 

Telemetry remained NSR with a few episodes of intermittent irregular rhythm over

night, rate remained controlled. Did initiate baby aspirin. She is a fall risk 

but HAS-BLED score low risk for major bleeding. Discuss continuance with PCP at 

time of follow up. Stable for discharge home today. Will complete PO antibiotic 

regime. Urine cultures are still pending at time of discharge and will treat 

empirically with cephalosporin until that time. 





PCP: Dr Garcia





Procedures: 


Imaging and X-Rays





09/19/19 11:02


CHEST 1 VIEW [RAD] Stat 








Cardiology Procedures





09/19/19 11:05


EKG NOW 





09/19/19 12:37


EKG NOW 





09/19/19 14:01


Cardiac Monitor .Continuous 


EKG QDX2@0600 











Abnormal Labs: 


                              Abnormal Lab Results











  09/19/19 09/19/19 09/19/19 Range/Units





  11:12 11:52 11:52 


 


RBC     (3.80-5.40)  M/uL


 


Hgb     (11.6-16.0)  gm/dl


 


Hct     (35.0-47.0)  %


 


MCV   103.5 H   (81-97)  fl


 


MCH   35.0 H   (27-33)  pg


 


MCHC     (32-36)  g/dl


 


RDW   14.6 H   (11.5-14.5)  %


 


MPV   11.7 H   (7.4-10.4)  fl


 


Lymphocytes %   14.0 L   (16-45)  %


 


Potassium    4.9 H  (3.4-4.5)  mmol/L


 


Chloride     ()  mmol/L


 


Lactic Acid     (0.5-2.2)  mmol/L


 


Calcium     (8.8-10.2)  mg/dL


 


Total Protein    6.1 L  (6.6-8.7)  g/dL


 


Albumin    3.2 L  (4.0-5.0)  g/dL


 


Lipase    9 L  (13-60)  U/L


 


Urine Ketones  15 mg/dl H    (NEGATIVE)  


 


Urine Nitrite  Positive H    (NEGATIVE)  














  09/19/19 09/20/19 09/20/19 Range/Units





  11:52 06:25 06:25 


 


RBC   3.40 L   (3.80-5.40)  M/uL


 


Hgb   10.3 L   (11.6-16.0)  gm/dl


 


Hct   34.4 L   (35.0-47.0)  %


 


MCV   101.2 H   (81-97)  fl


 


MCH     (27-33)  pg


 


MCHC   29.9 L   (32-36)  g/dl


 


RDW     (11.5-14.5)  %


 


MPV   11.6 H   (7.4-10.4)  fl


 


Lymphocytes %     (16-45)  %


 


Potassium     (3.4-4.5)  mmol/L


 


Chloride    110 H  ()  mmol/L


 


Lactic Acid  < 0.2 L    (0.5-2.2)  mmol/L


 


Calcium    7.8 L  (8.8-10.2)  mg/dL


 


Total Protein    4.7 L  (6.6-8.7)  g/dL


 


Albumin    2.7 L  (4.0-5.0)  g/dL


 


Lipase     (13-60)  U/L


 


Urine Ketones     (NEGATIVE)  


 


Urine Nitrite     (NEGATIVE)  











Condition at Discharge: (2) Stable





Discharge Medications





- Discharge Medications


Prescriptions: 


Aspirin Chewable 81 mg PO DAILY #30 tab.chew


Cefdinir [Omnicef] 300 mg PO BID #14 cap


Home Medications: 


                                Ambulatory Orders





Acetaminophen 325 mg PO Q4H PRN  tab 09/03/19 [Last Taken Unknown]


Cetirizine HCl [Zyrtec] 10 mg PO QD  tab 09/03/19 [Last Taken Unknown]


Donepezil HCl 10 mg PO QD  tab 09/03/19 [Last Taken 09/19/19]


Ergocalciferol (Vitamin D2) [Vitamin D2] 1.25 mg PO QWEEK  cap 09/03/19 [Last 

Taken Unknown]


Levothyroxine Sodium 50 mcg PO QD  tab 09/03/19 [Last Taken 09/19/19]


Loperamide HCl [Imodium A-D] 2 mg PO ASDIR PRN  cap 09/03/19 [Last Taken 

Unknown]


Memantine HCl 5 mg PO 1-2XD  tab 09/03/19 [Last Taken 09/19/19]


Ondansetron [Ondansetron Odt] 4 mg PO Q6H PRN  tab.rapdis 09/03/19 [Last Taken 

Unknown]


Potassium Chloride 5 meq PO DAILY  tab 09/03/19 [Last Taken 09/19/19]


Risperidone [Risperidone Odt] 2 mg PO QD  tab 09/03/19 [Last Taken 09/19/19]


Temazepam 30 mg PO QD  cap 09/03/19 [Last Taken 09/18/19]


Hydrocodone/Acetaminophen [Hydrocodone-Acetamin 5-325 mg] 1 each PO Q6H PRN 

09/19/19 [Last Taken 09/19/19]


Aspirin Chewable 81 mg PO DAILY #30 tab.chew 09/21/19 [Last Taken Unknown]


Cefdinir [Omnicef] 300 mg PO BID #14 cap 09/21/19 [Last Taken Unknown]











Discharge Plan





- Discharge Instructions


Activity at Discharge: As Per Physical Therapy


Diet at Discharge: Advance to Usual Diet


Additional Instructions: 


Videofluoroscopic Swallow Study at [facility] Tuesday, October 1st at 

12:45pm. 


Phone: [phone] or [phone] Address: [address]


They will call you to preregister. Please bring a list of, or any packaged 

nonperishable foods that you have trouble swallowing to test with you.





[facility] will be contacting you to re-establish care and set 

up visits. 








Follow up with PCP in 1 week





Quality Measures





- Quality Measures


Quality Measures: Atrial Fibrillation & Atrial Flutter: Chronic Anticoagulation 

Therapy, Advance Directives, Documentation of Current Medications in Medical 

Record, Elder Maltreatment Screen and Follow-Up Plan, Screening for High Blood 

Pressure and F/U Documented





- Current Medications


Quality Measure: Measure #130: Documentation of Current Medications


Documentation of Current Medications: <Current Medications Documented/Reviewed> 

[]





- Blood Pressure Screening


Quality Measure: Screening for High Blood Pressure and Follow-Up Documented


Does Patient Have Any of the Following: No


Blood Pressure Classification: Normal BP Reading


Systolic Measurement: 101


Diastolic Measurement: 48


Screening for High Blood Pressure: < Normal BP, F/U Not Required > []





- Atrial Fibrillation and Atrial Flutter


Quality Measure: Atrial Fibrillation & Atrial Flutter: Chronic Anticoagulation 

Therapy


Does Patient Have Any of the Following: No


CHADS2 Risk Stratification: Age 75 or Greater


Risk Stratification Summary: No risk factors or only one moderate risk factor 

exists. []


Anticoagulation Therapy: <Oral anticoagulant Prescribed> []





- Advance Directives


Quality Measure: Measure #47: Care Plan


Advance Directives Established: Yes


Advance Directives Information Provided To Patient: Already Provided


Advance Directives on File: Yes


Living Will: No


Power of : No


Advance Care Planning: <Care Plan/Decision Maker Documented; Discussed & 

Documented> [7733F]





- Elder Abuse Suspicion Index


Screening: Elder Abuse Suspicion Index Screening


Rely on people for bathing, dressing, shopping, banking, etc: Yes


Prevented from getting food, clothes, medication, etc: No


Made to feel shamed or threatened by someone: No


Forced to sign papers or use money against will: No


Feel afraid, touched in ways not wanted or hurt physically: No


Poor eye contact, withdrawn, malnourished, cuts or bruises: No


Screening Result: Negative result


EASI Reference Information: Wanda QUILES, Kenroy C, Jagruti D, Kellie M.Development

 and validation of a tool to assist physicians identification of elder abuse: 

The Elder Abuse Suspicion  Index (EASI ).  Journal of Elder Abuse and Neglect, 

2008; 20 (3): 276-300.





- Elder Maltreatment Screen


Quality Measures: Elder Maltreatment Screen and Follow-Up Plan


Elder Maltreatment Screen: <Negative, No Follow-Up Plan Required> []

## 2019-09-22 LAB
ABSOLUTE NEUTROPHIL COUNT: 2.63
ANISOCYTOSIS BLD QL SMEAR: (no result)
EOSINOPHIL NFR BLD: 3 % (ref 0–6)
ERYTHROCYTE [DISTWIDTH] IN BLOOD BY AUTOMATED COUNT: 14.5 % (ref 11.5–14.5)
HCT VFR BLD CALC: 34.2 % (ref 35–47)
HGB BLD-MCNC: 10.7 GM/DL (ref 11.6–16)
LYMPHOCYTES NFR BLD: 56 % (ref 16–45)
MCH RBC QN AUTO: 30.7 PG (ref 27–33)
MCHC RBC AUTO-ENTMCNC: 31.3 G/DL (ref 32–36)
MCV RBC AUTO: 98.3 FL (ref 81–97)
MONOCYTES NFR BLD: 6 % (ref 0–9)
NEUTROPHILS NFR BLD AUTO: 35 % (ref 47–80)
PLATELET # BLD EST: NORMAL 10*3/UL
PLATELET # BLD: 233 K/UL (ref 130–400)
PMV BLD AUTO: 10.7 FL (ref 7.4–10.4)
RBC # BLD AUTO: 3.48 M/UL (ref 3.8–5.4)
WBC # BLD AUTO: 7.6 K/UL (ref 4.2–12.2)

## 2019-09-22 RX ADMIN — MEMANTINE HYDROCHLORIDE SCH MG: 10 TABLET ORAL at 09:22

## 2019-09-22 RX ADMIN — IPRATROPIUM BROMIDE AND ALBUTEROL SULFATE SCH: .5; 2.5 SOLUTION RESPIRATORY (INHALATION) at 06:26

## 2019-09-22 RX ADMIN — ASPIRIN 81 MG CHEWABLE TABLET SCH MG: 81 TABLET CHEWABLE at 09:22

## 2019-09-22 RX ADMIN — IPRATROPIUM BROMIDE AND ALBUTEROL SULFATE SCH: .5; 2.5 SOLUTION RESPIRATORY (INHALATION) at 06:27

## 2019-09-22 RX ADMIN — CEFDINIR SCH MG: 300 CAPSULE ORAL at 09:22

## 2019-09-22 RX ADMIN — LEVOTHYROXINE SODIUM SCH MCG: 50 TABLET ORAL at 06:01

## 2019-09-22 RX ADMIN — RISPERIDONE SCH MG: 1 TABLET, FILM COATED ORAL at 09:23

## 2019-09-22 RX ADMIN — POTASSIUM CHLORIDE SCH MEQ: 750 TABLET, FILM COATED, EXTENDED RELEASE ORAL at 09:22

## 2019-09-22 NOTE — DISCHARGE SUMMARY
Providers


Discharge Summary Date: 09/22/19


Date of admission: 


09/20/19 04:44





Attending physician: 


ZOE FLYNN








Physical Exam





- Vital Signs


Vital Signs: 


                            Vital Signs - Last 24 Hrs











  Temp Pulse Pulse Pulse Resp BP Pulse Ox


 


 09/22/19 09:20  97.7 F    66  18  134/76  94 L


 


 09/22/19 07:43  98.1 F    60  16  120/57  93 L


 


 09/22/19 06:00  98.4 F    50 L  16  110/56  95


 


 09/21/19 21:04  98.5 F   64  62  16  114/57  96


 


 09/21/19 21:00     62  16  


 


 09/21/19 18:45  98.2 F    73  18  96/54  94 L


 


 09/21/19 17:20   85    20  


 


 09/21/19 14:00  98.1 F    67  17  100/54  95














- General


General Appearance: Alert, Cooperative, No acute distress


Limitations: No limitations





- Head


Head exam: Atraumatic, Normocephalic





- Eye


Eye exam: Normal appearance, PERRL





- ENT


ENT exam: Mucous membranes moist


Mouth exam: Other (abnormal tongue thrusting)


Throat exam: Normal inspection, Other (weak voice and swallow of secretions).  

negative: Tonsillar erythema, Tonsillar exudate





- Neck


Neck exam: Normal inspection, Full ROM.  negative: Tenderness





- Respiratory


Respiratory exam: Normal lung sounds bilaterally.  negative: Respiratory 

distress





- Cardiovascular


Cardiovascular Exam: Regular rate, Normal rhythm, Normal heart sounds





- GI/Abdominal


GI/Abdominal exam: Soft, Normal bowel sounds, Other (steri strips in place to 

laparoscopic sites ).  negative: Tenderness





- Extremities


Extremities exam: Normal inspection, Full ROM, Normal capillary refill.  

negative: Tenderness





- Neurological


Neurological exam: Abnormal gait, Alert.  negative: Motor sensory deficit, 

Normal gait





- Psychiatric


Psychiatric exam: Flat affect





- Skin


Skin exam: negative: Rash





Hospitalization





- Hospitalization


Admission Diagnosis: 1. Acute Weakness with UTI and possible Aspiration.  2. New

Onset Afib.





- Problem List/Discharge Diagnosis


(1) Pneumonia


Status: Acute   


Discharge Diagnosis: 


   Pneumonia type: aspiration pneumonia   Lung location: upper lobe of lung 


Base Code: J18.9 - PNEUMONIA, UNSPECIFIED ORGANISM   Comment: 9/21/19


- CXR shows likely right perihilar infiltrate, given dysphagia most likely 

aspiration, hyperinflated lungs


- Rocephin 1gm 


- Supplemental oxygen


- NS @ 100 ml/hr


- Albuterol Q2hr PRN, Duoneb Q6hr WA


- IS


- Troponin #1 neg, #2 neg, #3 neg


- Procalcitonin 0.038, will continue antibiotics as likely aspirating


- Afebrile, clinically stable, most likey DC home tomorrow   





(2) UTI (urinary tract infection)


Status: Acute   


Discharge Diagnosis: 


   Urinary tract infection type: site unspecified   Hematuria presence: without 

hematuria   Qualified Code(s): N39.0 - Urinary tract infection, site not 

specified   


Base Code: N39.0 - URINARY TRACT INFECTION, SITE NOT SPECIFIED   Comment: 

9/21/19


- Rocephin 1gm BID


- Urine culture pending


- IVF


   





(3) A-fib


Status: Acute   Base Code: I48.91 - UNSPECIFIED ATRIAL FIBRILLATION   Comment: 

9/21/19


- No previous known hx a-fib, no known hx hemorrhagic stroke


- Did spontaneous convert in ED, rate remained controlled


- Is on no chronic anticoagulant therapy


- Telemetry remains NSR, Repeat EKG NSR


- Is a high fall risk, however, HAS-BLED score 1. Will prophylax with baby ASA  







(4) Dementia


Status: Acute   Base Code: F03.90 - UNSPECIFIED DEMENTIA WITHOUT BEHAVIORAL 

DISTURBANCE   Comment: 9/21/19


- Continue home meds   





(5) Bipolar disorder


Status: Acute   Base Code: F31.9 - BIPOLAR DISORDER, UNSPECIFIED   Comment: 

9/21/19


- Well controlled


- She does have EPS from antipsychotic with involuntary leg jerking and tongue 

rolling   





(6) Schizophrenia


Status: Acute   Base Code: F20.9 - SCHIZOPHRENIA, UNSPECIFIED   Comment: 9/21/19


- Continue home meds   





(7) Dysphagia


Status: Acute   Base Code: R13.10 - DYSPHAGIA, UNSPECIFIED   Comment: 9/20/19


- Unknown etiology


- SLP eval done, will need VFSS at outpaient ( not performed at [facility]), continue 

honey thickened liquids, mech soft diet, no straws, crushed meds


   





(8) Hypothyroidism


Status: Acute   Base Code: E03.9 - HYPOTHYROIDISM, UNSPECIFIED   Comment: 

9/21/19


- TSH in ED 1.55


- Continue Synthroid 50mcg   





(9) DVT prophylaxis


Status: Acute   Base Code: Z29.9 - ENCOUNTER FOR PROPHYLACTIC MEASURES, 

UNSPECIFIED   Comment: 9/21/19


- Ambulation with assist


- PT/OT eval


- ASA 81mg


- SCD in bed   





(10) Full code status


Status: Acute   Base Code: Z78.9 - OTHER SPECIFIED HEALTH STATUS   





- Hospitalization Course


Disposition: Home Health Service


Hospital Course: 


78 y/o female brought to ED via EMS for hypoxia, JENI and weakness. She had a lap

alex at [facility] 9/16/19, staff at the [facility] in which she lives noticed her having di

fficulty swallowing her usual medications and usual diet. She was very weak, not

able to ambulate with her walker as usual. Patient denied any fever, chills, 

dysuria or cough at time of arrival.  She has a long history of dysphagia with 

unknown etiology. She recently moved to ER from [facility] in Dec. 2018 and

lives at [facility]. She is unmarried with no children. She is cared for by 

her brother , Al, who is her DPOA and sister in law Lizeth.  Per family report 

she has been very inactive since moving to ER, lays in bed often. Past medical 

history includes recurrent aspiration pneumonia (per ARLENE has had 3-4 times in 

the last 9 months), dementia, EPS from antipsychotic use, nausea, dysphagia, OA,

OP, bipolar, schizophrenia.














While in ED U/A positive for nitrites and 2+ bacteria,and ketones, CXR suspected

infiltrate right hemithorax, hyperinflated lungs. Inital EKG showed a-fib with 

controlled rate of 71 with spontaneous conversion to NSR. All other labs 

unremarkable for acute process. She was admitted  for IV antibiotics, SLP eval, 

supplemental oxygen, IV hydration, PT/OT eval

















9/19/19 1600- resting in bed comfortably, in no distress. ARLENE gave majority of 

PMH and HPI. Patient able to engage in history and was able to give an accurate 

account of events








9/20/19 0840- Hospital course has been unremarkable. Patient has tolerated 

Rocephin for both UTI and pneumonia- suspected aspiration given hx of dysphagia 

and recurrent pneumonia. She did have SLP eval, continue with honey thick 

liquids, crushed meds, no straws, and mechanical soft foods. VFSS recommended 

and scheduled as outpatient as not performed at this hospital. PT/OT eval 

completed and recommended continued outpatient therapy services which were 

arranged in addition to home nursing due to dysphagia and recurrent pneumonia. 

She was found to be in a-fib in the ED upon arrival with controlled rate, she 

spontaneously converted prior to admission. No previous history of a-fib. 

Telemetry remained NSR with a few episodes of intermittent irregular rhythm over

night, rate remained controlled. Did initiate baby aspirin. She is a fall risk 

but HAS-BLED score low risk for major bleeding. Discuss continuance with PCP at 

time of follow up. Stable for discharge home today. Will complete PO antibiotic 

regime. Urine cultures are still pending at time of discharge and will treat 

empirically with cephalosporin until that time. (See addendum 9/21/19 for 

delayed discharge documentation)





9/21/19 - 0930 No acute changes overnight. Remained stable, tolerating dysphagia

diet without issue. Stable for discharge home





PCP: Dr Garcia





Procedures: 


Imaging and X-Rays





09/19/19 11:02


CHEST 1 VIEW [RAD] Stat 








Cardiology Procedures





09/19/19 11:05


EKG NOW 





09/19/19 12:37


EKG NOW 





09/19/19 14:01


Cardiac Monitor .Continuous 


EKG QDX2@0600 











Abnormal Labs: 


                              Abnormal Lab Results











  09/19/19 09/19/19 09/19/19 Range/Units





  11:12 11:52 11:52 


 


RBC     (3.80-5.40)  M/uL


 


Hgb     (11.6-16.0)  gm/dl


 


Hct     (35.0-47.0)  %


 


MCV   103.5 H   (81-97)  fl


 


MCH   35.0 H   (27-33)  pg


 


MCHC     (32-36)  g/dl


 


RDW   14.6 H   (11.5-14.5)  %


 


MPV   11.7 H   (7.4-10.4)  fl


 


Neutrophils %     (47-80)  %


 


Lymphocytes %   14.0 L   (16-45)  %


 


Lymphocytes     (16-45)  %


 


Potassium    4.9 H  (3.4-4.5)  mmol/L


 


Chloride     ()  mmol/L


 


Lactic Acid     (0.5-2.2)  mmol/L


 


Calcium     (8.8-10.2)  mg/dL


 


Total Protein    6.1 L  (6.6-8.7)  g/dL


 


Albumin    3.2 L  (4.0-5.0)  g/dL


 


Lipase    9 L  (13-60)  U/L


 


Urine Ketones  15 mg/dl H    (NEGATIVE)  


 


Urine Nitrite  Positive H    (NEGATIVE)  














  09/19/19 09/20/19 09/20/19 Range/Units





  11:52 06:25 06:25 


 


RBC   3.40 L   (3.80-5.40)  M/uL


 


Hgb   10.3 L   (11.6-16.0)  gm/dl


 


Hct   34.4 L   (35.0-47.0)  %


 


MCV   101.2 H   (81-97)  fl


 


MCH     (27-33)  pg


 


MCHC   29.9 L   (32-36)  g/dl


 


RDW     (11.5-14.5)  %


 


MPV   11.6 H   (7.4-10.4)  fl


 


Neutrophils %     (47-80)  %


 


Lymphocytes %     (16-45)  %


 


Lymphocytes     (16-45)  %


 


Potassium     (3.4-4.5)  mmol/L


 


Chloride    110 H  ()  mmol/L


 


Lactic Acid  < 0.2 L    (0.5-2.2)  mmol/L


 


Calcium    7.8 L  (8.8-10.2)  mg/dL


 


Total Protein    4.7 L  (6.6-8.7)  g/dL


 


Albumin    2.7 L  (4.0-5.0)  g/dL


 


Lipase     (13-60)  U/L


 


Urine Ketones     (NEGATIVE)  


 


Urine Nitrite     (NEGATIVE)  














  09/22/19 Range/Units





  06:00 


 


RBC  3.48 L  (3.80-5.40)  M/uL


 


Hgb  10.7 L  (11.6-16.0)  gm/dl


 


Hct  34.2 L  (35.0-47.0)  %


 


MCV  98.3 H  (81-97)  fl


 


MCH   (27-33)  pg


 


MCHC  31.3 L  (32-36)  g/dl


 


RDW   (11.5-14.5)  %


 


MPV  10.7 H  (7.4-10.4)  fl


 


Neutrophils %  35.0 L  (47-80)  %


 


Lymphocytes %   (16-45)  %


 


Lymphocytes  56.0 H  (16-45)  %


 


Potassium   (3.4-4.5)  mmol/L


 


Chloride   ()  mmol/L


 


Lactic Acid   (0.5-2.2)  mmol/L


 


Calcium   (8.8-10.2)  mg/dL


 


Total Protein   (6.6-8.7)  g/dL


 


Albumin   (4.0-5.0)  g/dL


 


Lipase   (13-60)  U/L


 


Urine Ketones   (NEGATIVE)  


 


Urine Nitrite   (NEGATIVE)  











Condition at Discharge: (2) Stable





Discharge Medications





- Discharge Medications


Prescriptions: 


Aspirin Chewable 81 mg PO DAILY #30 tab.chew


Cefdinir [Omnicef] 300 mg PO BID #14 cap


Home Medications: 


                                Ambulatory Orders





Acetaminophen 325 mg PO Q4H PRN  tab 09/03/19 [Last Taken Unknown]


Cetirizine HCl [Zyrtec] 10 mg PO QD  tab 09/03/19 [Last Taken Unknown]


Donepezil HCl 10 mg PO QD  tab 09/03/19 [Last Taken 09/19/19]


Ergocalciferol (Vitamin D2) [Vitamin D2] 1.25 mg PO QWEEK  cap 09/03/19 [Last 

Taken Unknown]


Levothyroxine Sodium 50 mcg PO QD  tab 09/03/19 [Last Taken 09/19/19]


Loperamide HCl [Imodium A-D] 2 mg PO ASDIR PRN  cap 09/03/19 [Last Taken 

Unknown]


Memantine HCl 5 mg PO 1-2XD  tab 09/03/19 [Last Taken 09/19/19]


Ondansetron [Ondansetron Odt] 4 mg PO Q6H PRN  tab.rapdis 09/03/19 [Last Taken 

Unknown]


Potassium Chloride 5 meq PO DAILY  tab 09/03/19 [Last Taken 09/19/19]


Risperidone [Risperidone Odt] 2 mg PO QD  tab 09/03/19 [Last Taken 09/19/19]


Temazepam 30 mg PO QD  cap 09/03/19 [Last Taken 09/18/19]


Hydrocodone/Acetaminophen [Hydrocodone-Acetamin 5-325 mg] 1 each PO Q6H PRN 

09/19/19 [Last Taken 09/19/19]


Aspirin Chewable 81 mg PO DAILY #30 tab.chew 09/21/19 [Last Taken Unknown]


Cefdinir [Omnicef] 300 mg PO BID #14 cap 09/21/19 [Last Taken Unknown]











Discharge Plan





- Discharge Instructions


Activity at Discharge: As Per Physical Therapy, Increase Activity as Tolerated


Diet at Discharge: Advance to Usual Diet (dysphagia as listed, mechanical soft, 

honey thick liquids)


Instructions:  A-fib (Atrial Fibrillation) (DC), Urinary Tract Infection in 

Women (DC), Aspiration Pneumonia (DC), Chronic Dysphagia (DC), Community 

Acquired Pneumonia (DC)


Additional Instructions: 


Videofluoroscopic Swallow Study at [facility] Tuesday, October 1st at 

12:45pm. 


Phone: [phone] or [phone] Address: [address]


They will call you to preregister. Please bring a list of, or any packaged 

nonperishable foods that you have trouble swallowing to test with you.





[facility] will be contacting you to re-establish care and set 

up visits. 








Follow up with PCP in 1 week





Activity as tolerated





Resume home meds


Take one baby aspirin daily starting tomorrow.


Take cefdinir twice a day starting tonight





Quality Measures





- Quality Measures


Quality Measures: Atrial Fibrillation & Atrial Flutter: Chronic Anticoagulation 

Therapy, Advance Directives, Documentation of Current Medications in Medical 

Record, Elder Maltreatment Screen and Follow-Up Plan, Screening for High Blood 

Pressure and F/U Documented





- Current Medications


Quality Measure: Measure #130: Documentation of Current Medications


Documentation of Current Medications: <Current Medications Documented/Reviewed> 

[]





- Blood Pressure Screening


Quality Measure: Screening for High Blood Pressure and Follow-Up Documented


Does Patient Have Any of the Following: No


Blood Pressure Classification: Normal BP Reading


Systolic Measurement: 101


Diastolic Measurement: 48


Screening for High Blood Pressure: < Normal BP, F/U Not Required > []





- Atrial Fibrillation and Atrial Flutter


Quality Measure: Atrial Fibrillation & Atrial Flutter: Chronic Anticoagulation 

Therapy


Does Patient Have Any of the Following: No


CHADS2 Risk Stratification: Age 75 or Greater


Risk Stratification Summary: No risk factors or only one moderate risk factor 

exists. []


Anticoagulation Therapy: <Oral anticoagulant Prescribed> []





- Advance Directives


Quality Measure: Measure #47: Care Plan


Advance Directives Established: Yes


Advance Directives Information Provided To Patient: Already Provided


Advance Directives on File: Yes


Living Will: No


Power of : No


Advance Care Planning: <Care Plan/Decision Maker Documented; Discussed & 

Documented> [6518V]





- Elder Abuse Suspicion Index


Screening: Elder Abuse Suspicion Index Screening


Rely on people for bathing, dressing, shopping, banking, etc: Yes


Prevented from getting food, clothes, medication, etc: No


Made to feel shamed or threatened by someone: No


Forced to sign papers or use money against will: No


Feel afraid, touched in ways not wanted or hurt physically: No


Poor eye contact, withdrawn, malnourished, cuts or bruises: No


Screening Result: Negative result


EASI Reference Information: Wanda QUILES, Kenroy MARIN, Jagruti AZUL, Kellie CARMONA.Development

 and validation of a tool to assist physicians identification of elder abuse: 

The Elder Abuse Suspicion  Index (EASI ).  Journal of Elder Abuse and Neglect, 

2008; 20 (3): 276-300.





- Elder Maltreatment Screen


Quality Measures: Elder Maltreatment Screen and Follow-Up Plan


Elder Maltreatment Screen: <Negative, No Follow-Up Plan Required> []